# Patient Record
Sex: FEMALE | Race: WHITE | URBAN - METROPOLITAN AREA
[De-identification: names, ages, dates, MRNs, and addresses within clinical notes are randomized per-mention and may not be internally consistent; named-entity substitution may affect disease eponyms.]

---

## 2017-08-09 PROBLEM — I10 ESSENTIAL HYPERTENSION: Status: ACTIVE | Noted: 2017-08-09

## 2017-09-20 PROBLEM — C44.91 BASAL CELL CARCINOMA OF SKIN: Status: ACTIVE | Noted: 2017-09-20

## 2019-04-25 ENCOUNTER — OFFICE VISIT (OUTPATIENT)
Dept: PRIMARY CARE CLINIC | Age: 64
End: 2019-04-25
Payer: COMMERCIAL

## 2019-04-25 VITALS
OXYGEN SATURATION: 98 % | BODY MASS INDEX: 27.67 KG/M2 | DIASTOLIC BLOOD PRESSURE: 86 MMHG | WEIGHT: 137 LBS | HEART RATE: 81 BPM | SYSTOLIC BLOOD PRESSURE: 132 MMHG

## 2019-04-25 DIAGNOSIS — F41.9 ANXIETY: ICD-10-CM

## 2019-04-25 DIAGNOSIS — E78.5 HYPERLIPIDEMIA, UNSPECIFIED HYPERLIPIDEMIA TYPE: ICD-10-CM

## 2019-04-25 DIAGNOSIS — I10 ESSENTIAL HYPERTENSION: Primary | ICD-10-CM

## 2019-04-25 PROCEDURE — 99213 OFFICE O/P EST LOW 20 MIN: CPT | Performed by: FAMILY MEDICINE

## 2019-04-25 RX ORDER — ALPRAZOLAM 0.25 MG/1
0.25 TABLET ORAL 2 TIMES DAILY PRN
Qty: 30 TABLET | Refills: 0 | Status: SHIPPED | OUTPATIENT
Start: 2019-04-25 | End: 2020-09-14 | Stop reason: SDUPTHER

## 2019-04-25 ASSESSMENT — ENCOUNTER SYMPTOMS
SHORTNESS OF BREATH: 0
COUGH: 0
SORE THROAT: 0
NAUSEA: 0
RHINORRHEA: 0
EYE REDNESS: 0
WHEEZING: 0
VOMITING: 0
ABDOMINAL PAIN: 0
DIARRHEA: 0
EYE DISCHARGE: 0

## 2019-04-25 ASSESSMENT — PATIENT HEALTH QUESTIONNAIRE - PHQ9
2. FEELING DOWN, DEPRESSED OR HOPELESS: 0
1. LITTLE INTEREST OR PLEASURE IN DOING THINGS: 0
SUM OF ALL RESPONSES TO PHQ QUESTIONS 1-9: 0
SUM OF ALL RESPONSES TO PHQ QUESTIONS 1-9: 0
SUM OF ALL RESPONSES TO PHQ9 QUESTIONS 1 & 2: 0

## 2019-04-25 NOTE — PROGRESS NOTES
Maintenance   Topic Date Due    Cervical cancer screen  10/19/1978    Potassium monitoring  09/04/2019    Creatinine monitoring  09/04/2019    Breast cancer screen  08/02/2020    DTaP/Tdap/Td vaccine (2 - Td) 09/20/2020    Lipid screen  09/04/2023    Colon cancer screen colonoscopy  05/18/2026    Flu vaccine  Completed    Shingles Vaccine  Completed    Pneumococcal 0-64 years Vaccine  Completed    Hepatitis C screen  Completed    HIV screen  Completed       Subjective:      Review of Systems   Constitutional: Negative for chills, diaphoresis and fever. HENT: Negative for rhinorrhea and sore throat. Eyes: Negative for discharge and redness. Respiratory: Negative for cough, shortness of breath and wheezing. Cardiovascular: Negative for chest pain and palpitations. Gastrointestinal: Negative for abdominal pain, diarrhea, nausea and vomiting. Genitourinary: Negative for dysuria and frequency. Musculoskeletal: Negative for arthralgias and myalgias. Neurological: Positive for light-headedness (if gets up too fast). Negative for dizziness and headaches. Psychiatric/Behavioral: Negative for sleep disturbance. Objective:     /86   Pulse 81   Wt 137 lb (62.1 kg)   SpO2 98%   BMI 27.67 kg/m²   Physical Exam   Constitutional: She is oriented to person, place, and time. She appears well-developed and well-nourished. No distress. HENT:   Head: Normocephalic and atraumatic. Mouth/Throat: Oropharynx is clear and moist.   Eyes: Pupils are equal, round, and reactive to light. Conjunctivae are normal. Right eye exhibits no discharge. Left eye exhibits no discharge. No scleral icterus. Neck: No tracheal deviation present. No thyromegaly present. Cardiovascular: Normal rate, regular rhythm and normal heart sounds. No carotid bruits   Pulmonary/Chest: Effort normal and breath sounds normal. No respiratory distress. She has no wheezes. Musculoskeletal: She exhibits no edema.

## 2019-07-02 RX ORDER — ATORVASTATIN CALCIUM 10 MG/1
TABLET, FILM COATED ORAL
Qty: 90 TABLET | Refills: 3 | Status: SHIPPED | OUTPATIENT
Start: 2019-07-02 | End: 2019-09-12 | Stop reason: SDUPTHER

## 2019-09-05 LAB
ALBUMIN SERPL-MCNC: NORMAL G/DL
ALP BLD-CCNC: NORMAL U/L
ALT SERPL-CCNC: NORMAL U/L
ANION GAP SERPL CALCULATED.3IONS-SCNC: NORMAL MMOL/L
AST SERPL-CCNC: NORMAL U/L
BASOPHILS ABSOLUTE: NORMAL /ΜL
BASOPHILS RELATIVE PERCENT: NORMAL %
BILIRUB SERPL-MCNC: NORMAL MG/DL (ref 0.1–1.4)
BUN BLDV-MCNC: NORMAL MG/DL
CALCIUM SERPL-MCNC: NORMAL MG/DL
CHLORIDE BLD-SCNC: NORMAL MMOL/L
CHOLESTEROL, TOTAL: 205 MG/DL
CHOLESTEROL/HDL RATIO: 3
CO2: NORMAL MMOL/L
CREAT SERPL-MCNC: NORMAL MG/DL
EOSINOPHILS ABSOLUTE: NORMAL /ΜL
EOSINOPHILS RELATIVE PERCENT: NORMAL %
GFR CALCULATED: NORMAL
GLUCOSE BLD-MCNC: 94 MG/DL
HCT VFR BLD CALC: NORMAL % (ref 36–46)
HDLC SERPL-MCNC: 69 MG/DL (ref 35–70)
HEMOGLOBIN: NORMAL G/DL (ref 12–16)
LDL CHOLESTEROL CALCULATED: 107 MG/DL (ref 0–160)
LYMPHOCYTES ABSOLUTE: NORMAL /ΜL
LYMPHOCYTES RELATIVE PERCENT: NORMAL %
MCH RBC QN AUTO: NORMAL PG
MCHC RBC AUTO-ENTMCNC: NORMAL G/DL
MCV RBC AUTO: NORMAL FL
MONOCYTES ABSOLUTE: NORMAL /ΜL
MONOCYTES RELATIVE PERCENT: NORMAL %
NEUTROPHILS ABSOLUTE: NORMAL /ΜL
NEUTROPHILS RELATIVE PERCENT: NORMAL %
PDW BLD-RTO: NORMAL %
PLATELET # BLD: NORMAL K/ΜL
PMV BLD AUTO: NORMAL FL
POTASSIUM SERPL-SCNC: NORMAL MMOL/L
RBC # BLD: NORMAL 10^6/ΜL
SODIUM BLD-SCNC: NORMAL MMOL/L
TOTAL PROTEIN: NORMAL
TRIGL SERPL-MCNC: 146 MG/DL
VLDLC SERPL CALC-MCNC: 29 MG/DL
WBC # BLD: NORMAL 10^3/ML

## 2019-09-06 DIAGNOSIS — E78.5 HYPERLIPIDEMIA, UNSPECIFIED HYPERLIPIDEMIA TYPE: ICD-10-CM

## 2019-09-06 DIAGNOSIS — I10 ESSENTIAL HYPERTENSION: ICD-10-CM

## 2019-09-12 ENCOUNTER — OFFICE VISIT (OUTPATIENT)
Dept: PRIMARY CARE CLINIC | Age: 64
End: 2019-09-12
Payer: COMMERCIAL

## 2019-09-12 VITALS
HEART RATE: 75 BPM | WEIGHT: 139.2 LBS | OXYGEN SATURATION: 97 % | BODY MASS INDEX: 27.33 KG/M2 | SYSTOLIC BLOOD PRESSURE: 140 MMHG | HEIGHT: 60 IN | DIASTOLIC BLOOD PRESSURE: 88 MMHG

## 2019-09-12 DIAGNOSIS — I10 ESSENTIAL HYPERTENSION: Primary | ICD-10-CM

## 2019-09-12 PROCEDURE — 90686 IIV4 VACC NO PRSV 0.5 ML IM: CPT | Performed by: FAMILY MEDICINE

## 2019-09-12 PROCEDURE — 99213 OFFICE O/P EST LOW 20 MIN: CPT | Performed by: FAMILY MEDICINE

## 2019-09-12 PROCEDURE — 90471 IMMUNIZATION ADMIN: CPT | Performed by: FAMILY MEDICINE

## 2019-09-12 RX ORDER — ATORVASTATIN CALCIUM 10 MG/1
10 TABLET, FILM COATED ORAL DAILY
Qty: 90 TABLET | Refills: 3 | Status: SHIPPED | OUTPATIENT
Start: 2019-09-12 | End: 2020-08-31

## 2019-09-12 ASSESSMENT — ENCOUNTER SYMPTOMS
ABDOMINAL PAIN: 0
DIARRHEA: 0
SHORTNESS OF BREATH: 0
VOMITING: 0
SORE THROAT: 0
NAUSEA: 0
EYE DISCHARGE: 0
EYE REDNESS: 0
WHEEZING: 0
COUGH: 0
RHINORRHEA: 0

## 2019-09-12 NOTE — PROGRESS NOTES
[facility] PRIMARY [facility]  [address] [facility] [address]  Dept: [address] Manish is a 66 y.o. female who presents today for her medical conditions/complaintsas noted below. Chief Complaint   Patient presents with    Medication Check     5 month follow up.  Flu Vaccine     Pt wants her flu shot. HPI:     HPI-has been feeling okay. No new issues. LDL Calculated (mg/dL)   Date Value   2019 107   2018 113   2017 103       (goal LDL is <100)   No results found for: AST, ALT, BUN  BP Readings from Last 3 Encounters:   19 (!) 148/90   19 132/86   09/10/18 116/80          (goal 120/80)    No past medical history on file. No past surgical history on file. Family History   Problem Relation Age of Onset    Colon Cancer Father        Social History     Tobacco Use    Smoking status: Former Smoker     Packs/day: 0.50     Years: 15.00     Pack years: 7.50     Types: Cigarettes     Last attempt to quit: 1990     Years since quittin.7    Smokeless tobacco: Never Used   Substance Use Topics    Alcohol use: Not on file      Current Outpatient Medications   Medication Sig Dispense Refill    Magnesium 400 MG CAPS Take by mouth      atorvastatin (LIPITOR) 10 MG tablet Take 1 tablet by mouth daily 90 tablet 3    Misc Natural Products (GLUCOS-CHONDROIT-MSM COMPLEX) TABS Take 1 tablet by mouth 2 times daily 60 tablet 3    Omega-3 Fatty Acids (FISH OIL) 1200 MG CAPS Take 1,200 mg by mouth 2 times daily      Coenzyme Q10 (COQ10) 200 MG CAPS Take 200 mg by mouth daily      vitamin D (CHOLECALCIFEROL) 1000 UNITS TABS tablet Take 1,000 Units by mouth daily      Multiple Vitamins-Minerals (THERAPEUTIC MULTIVITAMIN-MINERALS) tablet Take 1 tablet by mouth daily      Carnitine-B5-B6 (L-CARNITINE 500 PO) Take 500 mg by mouth daily       No current facility-administered medications for this visit.       No Known Allergies    Health Maintenance   Topic Date Due    Flu vaccine (1) 09/01/2019    Cervical cancer screen  08/01/2020    Potassium monitoring  09/05/2020    Creatinine monitoring  09/05/2020    DTaP/Tdap/Td vaccine (2 - Td) 09/20/2020    Breast cancer screen  08/14/2021    Lipid screen  09/05/2024    Colon cancer screen colonoscopy  05/18/2026    Shingles Vaccine  Completed    Pneumococcal 0-64 years Vaccine  Completed    Hepatitis C screen  Completed    HIV screen  Completed       Subjective:      Review of Systems   Constitutional: Negative for chills and fever. HENT: Negative for congestion, rhinorrhea and sore throat. Eyes: Negative for discharge and redness. Respiratory: Negative for cough, shortness of breath and wheezing. Cardiovascular: Negative for chest pain and palpitations. Gastrointestinal: Negative for abdominal pain, diarrhea, nausea and vomiting. Genitourinary: Negative for dysuria and frequency. Musculoskeletal: Negative for arthralgias and myalgias. Neurological: Negative for dizziness, light-headedness and headaches. Psychiatric/Behavioral: Negative for sleep disturbance. Objective:     BP (!) 148/90 (Site: Right Upper Arm)   Pulse 75   Ht 5' (1.524 m)   Wt 139 lb 3.2 oz (63.1 kg)   SpO2 97%   BMI 27.19 kg/m²   Physical Exam   Constitutional: She is oriented to person, place, and time. She appears well-developed and well-nourished. No distress. HENT:   Head: Normocephalic and atraumatic. Mouth/Throat: Oropharynx is clear and moist.   Eyes: Pupils are equal, round, and reactive to light. Conjunctivae are normal. Right eye exhibits no discharge. Left eye exhibits no discharge. No scleral icterus. Neck: No tracheal deviation present. No thyromegaly present. Cardiovascular: Normal rate, regular rhythm and normal heart sounds. No carotid bruits   Pulmonary/Chest: Effort normal and breath sounds normal. No respiratory distress. She has no wheezes.

## 2020-08-31 RX ORDER — ATORVASTATIN CALCIUM 10 MG/1
10 TABLET, FILM COATED ORAL DAILY
Qty: 90 TABLET | Refills: 3 | Status: SHIPPED | OUTPATIENT
Start: 2020-08-31 | End: 2020-09-14 | Stop reason: SDUPTHER

## 2020-09-14 ENCOUNTER — OFFICE VISIT (OUTPATIENT)
Dept: PRIMARY CARE CLINIC | Age: 65
End: 2020-09-14
Payer: COMMERCIAL

## 2020-09-14 VITALS
SYSTOLIC BLOOD PRESSURE: 120 MMHG | TEMPERATURE: 96.8 F | DIASTOLIC BLOOD PRESSURE: 80 MMHG | WEIGHT: 129 LBS | OXYGEN SATURATION: 96 % | HEART RATE: 67 BPM | BODY MASS INDEX: 25.19 KG/M2

## 2020-09-14 PROCEDURE — 90471 IMMUNIZATION ADMIN: CPT | Performed by: FAMILY MEDICINE

## 2020-09-14 PROCEDURE — 90686 IIV4 VACC NO PRSV 0.5 ML IM: CPT | Performed by: FAMILY MEDICINE

## 2020-09-14 PROCEDURE — 90472 IMMUNIZATION ADMIN EACH ADD: CPT | Performed by: FAMILY MEDICINE

## 2020-09-14 PROCEDURE — 90715 TDAP VACCINE 7 YRS/> IM: CPT | Performed by: FAMILY MEDICINE

## 2020-09-14 PROCEDURE — 99213 OFFICE O/P EST LOW 20 MIN: CPT | Performed by: FAMILY MEDICINE

## 2020-09-14 RX ORDER — ATORVASTATIN CALCIUM 10 MG/1
10 TABLET, FILM COATED ORAL DAILY
Qty: 90 TABLET | Refills: 3 | Status: SHIPPED | OUTPATIENT
Start: 2020-09-14 | End: 2021-08-25

## 2020-09-14 RX ORDER — ALPRAZOLAM 0.25 MG/1
0.25 TABLET ORAL 2 TIMES DAILY PRN
Qty: 180 TABLET | Refills: 0 | Status: SHIPPED | OUTPATIENT
Start: 2020-09-14 | End: 2021-10-21 | Stop reason: SDUPTHER

## 2020-09-14 ASSESSMENT — PATIENT HEALTH QUESTIONNAIRE - PHQ9
2. FEELING DOWN, DEPRESSED OR HOPELESS: 0
SUM OF ALL RESPONSES TO PHQ9 QUESTIONS 1 & 2: 0
SUM OF ALL RESPONSES TO PHQ QUESTIONS 1-9: 0
1. LITTLE INTEREST OR PLEASURE IN DOING THINGS: 0
SUM OF ALL RESPONSES TO PHQ QUESTIONS 1-9: 0

## 2020-09-14 ASSESSMENT — ENCOUNTER SYMPTOMS
SHORTNESS OF BREATH: 0
SORE THROAT: 0
COUGH: 0
WHEEZING: 0
RHINORRHEA: 0
EYE DISCHARGE: 0
VOMITING: 0
DIARRHEA: 0
NAUSEA: 0
ABDOMINAL PAIN: 0
EYE REDNESS: 0

## 2020-09-14 NOTE — PROGRESS NOTES
[facility] PRIMARY [facility]ARE  [address] [place] [id number]  Dept: [address] [facility] Manish is a 60 y.o. female who presents today for her medical conditions/complaintsas noted below. Chief Complaint   Patient presents with    Hypertension     Patient checks bp at home     Medication Check    Medication Refill     Pending        HPI:     HPI- pt feeling okay. No problems. Needs flu shot and refills. Bp running good at home. LDL Calculated (mg/dL)   Date Value   2019 107   2018 113   2017 103       (goal LDL is <100)   No results found for: AST, ALT, BUN  BP Readings from Last 3 Encounters:   20 120/80   19 (!) 140/88   19 132/86          (goal 120/80)    No past medical history on file. No past surgical history on file. Family History   Problem Relation Age of Onset    Colon Cancer Father        Social History     Tobacco Use    Smoking status: Former Smoker     Packs/day: 0.50     Years: 15.00     Pack years: 7.50     Types: Cigarettes     Last attempt to quit:      Years since quittin.7    Smokeless tobacco: Never Used   Substance Use Topics    Alcohol use: Not on file      Current Outpatient Medications   Medication Sig Dispense Refill    atorvastatin (LIPITOR) 10 MG tablet Take 1 tablet by mouth daily 90 tablet 3    ALPRAZolam (XANAX) 0.25 MG tablet Take 1 tablet by mouth 2 times daily as needed for Sleep for up to 30 days.  180 tablet 0    Magnesium 400 MG CAPS Take by mouth      Misc Natural Products (GLUCOS-CHONDROIT-MSM COMPLEX) TABS Take 1 tablet by mouth 2 times daily 60 tablet 3    Omega-3 Fatty Acids (FISH OIL) 1200 MG CAPS Take 1,200 mg by mouth 2 times daily      Coenzyme Q10 (COQ10) 200 MG CAPS Take 200 mg by mouth daily      vitamin D (CHOLECALCIFEROL) 1000 UNITS TABS tablet Take 1,000 Units by mouth daily      Multiple Vitamins-Minerals (THERAPEUTIC MULTIVITAMIN-MINERALS) tablet Take 1 tablet by mouth daily      Carnitine-B5-B6 (L-CARNITINE 500 PO) Take 500 mg by mouth daily       No current facility-administered medications for this visit. No Known Allergies    Health Maintenance   Topic Date Due    Flu vaccine (1) 09/01/2020    Potassium monitoring  09/05/2020    Creatinine monitoring  09/05/2020    Lipid screen  09/05/2020    DTaP/Tdap/Td vaccine (2 - Td) 09/20/2020    Breast cancer screen  08/21/2022    Cervical cancer screen  08/25/2023    Colon cancer screen colonoscopy  05/18/2026    Shingles Vaccine  Completed    Pneumococcal 0-64 years Vaccine  Completed    Hepatitis C screen  Completed    HIV screen  Completed    Hepatitis A vaccine  Aged Out    Hepatitis B vaccine  Aged Out    Hib vaccine  Aged Out    Meningococcal (ACWY) vaccine  Aged Out       Subjective:      Review of Systems   Constitutional: Negative for chills and fever. HENT: Negative for rhinorrhea and sore throat. Eyes: Negative for discharge and redness. Respiratory: Negative for cough, shortness of breath and wheezing. Cardiovascular: Negative for chest pain and palpitations. Gastrointestinal: Negative for abdominal pain, diarrhea, nausea and vomiting. Genitourinary: Negative for dysuria and frequency. Musculoskeletal: Negative for arthralgias and myalgias. Neurological: Negative for dizziness, light-headedness and headaches. Psychiatric/Behavioral: Negative for sleep disturbance. Objective:     /80   Pulse 67   Temp 96.8 °F (36 °C)   Wt 129 lb (58.5 kg)   SpO2 96%   BMI 25.19 kg/m²   Physical Exam  Vitals signs and nursing note reviewed. Constitutional:       General: She is not in acute distress. Appearance: She is well-developed. She is not ill-appearing. HENT:      Head: Normocephalic and atraumatic. Right Ear: External ear normal.      Left Ear: External ear normal.   Eyes:      General: No scleral icterus. Right eye: No discharge. Left eye: No discharge. Conjunctiva/sclera: Conjunctivae normal.      Pupils: Pupils are equal, round, and reactive to light. Neck:      Thyroid: No thyromegaly. Trachea: No tracheal deviation. Cardiovascular:      Rate and Rhythm: Normal rate and regular rhythm. Heart sounds: Normal heart sounds. Pulmonary:      Effort: Pulmonary effort is normal. No respiratory distress. Breath sounds: Normal breath sounds. No wheezing. Lymphadenopathy:      Cervical: No cervical adenopathy. Skin:     General: Skin is warm. Findings: No rash. Neurological:      Mental Status: She is alert and oriented to person, place, and time. Psychiatric:         Mood and Affect: Mood normal.         Behavior: Behavior normal.         Thought Content: Thought content normal.         Assessment:       Diagnosis Orders   1. Essential hypertension     2. Anxiety  ALPRAZolam (XANAX) 0.25 MG tablet   3. Need for influenza vaccination  INFLUENZA, QUADV, 3 YRS AND OLDER, IM PF, PREFILL SYR OR SDV, 0.5ML (AFLURIA QUADV, PF)        Plan:      Return in about 3 months (around 12/14/2020) for HTN f/u. Orders Placed This Encounter   Procedures    INFLUENZA, QUADV, 3 YRS AND OLDER, IM PF, PREFILL SYR OR SDV, 0.5ML (AFLURIA QUADV, PF)     Orders Placed This Encounter   Medications    atorvastatin (LIPITOR) 10 MG tablet     Sig: Take 1 tablet by mouth daily     Dispense:  90 tablet     Refill:  3    ALPRAZolam (XANAX) 0.25 MG tablet     Sig: Take 1 tablet by mouth 2 times daily as needed for Sleep for up to 30 days. Dispense:  180 tablet     Refill:  0       Patient given educationalmaterials - see patient instructions. Discussed use, benefit, and side effectsof prescribed medications. All patient questions answered. Pt voiced understanding. Reviewed health maintenance. Instructed to continue current medications, diet andexercise. Patient agreed with treatment plan.  Follow

## 2020-09-15 ENCOUNTER — HOSPITAL ENCOUNTER (OUTPATIENT)
Age: 65
Setting detail: SPECIMEN
Discharge: HOME | End: 2020-09-15
Payer: COMMERCIAL

## 2020-09-15 LAB
ABSOLUTE EOS #: 0.16 K/UL (ref 0–0.44)
ABSOLUTE IMMATURE GRANULOCYTE: <0.03 K/UL (ref 0–0.3)
ABSOLUTE LYMPH #: 1.47 K/UL (ref 1.1–3.7)
ABSOLUTE MONO #: 0.45 K/UL (ref 0.1–1.2)
ANION GAP SERPL CALCULATED.3IONS-SCNC: 16 MMOL/L (ref 9–17)
BASOPHILS # BLD: 1 % (ref 0–2)
BASOPHILS ABSOLUTE: 0.04 K/UL (ref 0–0.2)
BUN BLDV-MCNC: 14 MG/DL (ref 8–23)
BUN/CREAT BLD: NORMAL (ref 9–20)
CALCIUM SERPL-MCNC: 9.8 MG/DL (ref 8.6–10.4)
CHLORIDE BLD-SCNC: 99 MMOL/L (ref 98–107)
CHOLESTEROL/HDL RATIO: 3.5
CHOLESTEROL: 197 MG/DL
CO2: 24 MMOL/L (ref 20–31)
CREAT SERPL-MCNC: 0.66 MG/DL (ref 0.5–0.9)
DIFFERENTIAL TYPE: NORMAL
EOSINOPHILS RELATIVE PERCENT: 3 % (ref 1–4)
GFR AFRICAN AMERICAN: >60 ML/MIN
GFR NON-AFRICAN AMERICAN: >60 ML/MIN
GFR SERPL CREATININE-BSD FRML MDRD: NORMAL ML/MIN/{1.73_M2}
GFR SERPL CREATININE-BSD FRML MDRD: NORMAL ML/MIN/{1.73_M2}
GLUCOSE BLD-MCNC: 75 MG/DL (ref 70–99)
HCT VFR BLD CALC: 43.2 % (ref 36.3–47.1)
HDLC SERPL-MCNC: 57 MG/DL
HEMOGLOBIN: 13.8 G/DL (ref 11.9–15.1)
IMMATURE GRANULOCYTES: 0 %
LDL CHOLESTEROL: 123 MG/DL (ref 0–130)
LYMPHOCYTES # BLD: 26 % (ref 24–43)
MCH RBC QN AUTO: 28.6 PG (ref 25.2–33.5)
MCHC RBC AUTO-ENTMCNC: 31.9 G/DL (ref 28.4–34.8)
MCV RBC AUTO: 89.4 FL (ref 82.6–102.9)
MONOCYTES # BLD: 8 % (ref 3–12)
NRBC AUTOMATED: 0 PER 100 WBC
PDW BLD-RTO: 13.2 % (ref 11.8–14.4)
PLATELET # BLD: 389 K/UL (ref 138–453)
PLATELET ESTIMATE: NORMAL
PMV BLD AUTO: 9.6 FL (ref 8.1–13.5)
POTASSIUM SERPL-SCNC: 4.1 MMOL/L (ref 3.7–5.3)
RBC # BLD: 4.83 M/UL (ref 3.95–5.11)
RBC # BLD: NORMAL 10*6/UL
SEG NEUTROPHILS: 62 % (ref 36–65)
SEGMENTED NEUTROPHILS ABSOLUTE COUNT: 3.53 K/UL (ref 1.5–8.1)
SODIUM BLD-SCNC: 139 MMOL/L (ref 135–144)
TRIGL SERPL-MCNC: 83 MG/DL
VLDLC SERPL CALC-MCNC: NORMAL MG/DL (ref 1–30)
WBC # BLD: 5.7 K/UL (ref 3.5–11.3)
WBC # BLD: NORMAL 10*3/UL

## 2021-08-04 ENCOUNTER — PATIENT MESSAGE (OUTPATIENT)
Dept: PRIMARY CARE CLINIC | Age: 66
End: 2021-08-04

## 2021-08-04 DIAGNOSIS — Z12.11 SCREENING FOR COLON CANCER: Primary | ICD-10-CM

## 2021-08-04 DIAGNOSIS — Z13.6 SCREENING FOR CARDIOVASCULAR CONDITION: ICD-10-CM

## 2021-08-04 DIAGNOSIS — Z13.1 SCREENING FOR DIABETES MELLITUS: ICD-10-CM

## 2021-08-25 RX ORDER — ATORVASTATIN CALCIUM 10 MG/1
10 TABLET, FILM COATED ORAL DAILY
Qty: 90 TABLET | Refills: 3 | Status: SHIPPED | OUTPATIENT
Start: 2021-08-25 | End: 2021-10-21 | Stop reason: SDUPTHER

## 2021-09-29 ENCOUNTER — HOSPITAL ENCOUNTER (OUTPATIENT)
Age: 66
Setting detail: SPECIMEN
Discharge: HOME | End: 2021-09-29
Payer: COMMERCIAL

## 2021-09-29 DIAGNOSIS — Z13.6 SCREENING FOR CARDIOVASCULAR CONDITION: ICD-10-CM

## 2021-09-29 DIAGNOSIS — Z13.1 SCREENING FOR DIABETES MELLITUS: ICD-10-CM

## 2021-09-29 DIAGNOSIS — Z12.11 SCREENING FOR COLON CANCER: ICD-10-CM

## 2021-09-29 LAB
ABSOLUTE EOS #: 0.12 K/UL (ref 0–0.44)
ABSOLUTE IMMATURE GRANULOCYTE: <0.03 K/UL (ref 0–0.3)
ABSOLUTE LYMPH #: 2.35 K/UL (ref 1.1–3.7)
ABSOLUTE MONO #: 0.54 K/UL (ref 0.1–1.2)
ALBUMIN SERPL-MCNC: 4.7 G/DL (ref 3.5–5.2)
ALBUMIN/GLOBULIN RATIO: 1.6 (ref 1–2.5)
ALP BLD-CCNC: 98 U/L (ref 35–104)
ALT SERPL-CCNC: 13 U/L (ref 5–33)
ANION GAP SERPL CALCULATED.3IONS-SCNC: 11 MMOL/L (ref 9–17)
AST SERPL-CCNC: 21 U/L
BASOPHILS # BLD: 1 % (ref 0–2)
BASOPHILS ABSOLUTE: 0.04 K/UL (ref 0–0.2)
BILIRUB SERPL-MCNC: 0.35 MG/DL (ref 0.3–1.2)
BUN BLDV-MCNC: 19 MG/DL (ref 8–23)
BUN/CREAT BLD: NORMAL (ref 9–20)
CALCIUM SERPL-MCNC: 9.8 MG/DL (ref 8.6–10.4)
CHLORIDE BLD-SCNC: 102 MMOL/L (ref 98–107)
CHOLESTEROL/HDL RATIO: 2.8
CHOLESTEROL: 203 MG/DL
CO2: 25 MMOL/L (ref 20–31)
CREAT SERPL-MCNC: 0.6 MG/DL (ref 0.5–0.9)
DIFFERENTIAL TYPE: ABNORMAL
EOSINOPHILS RELATIVE PERCENT: 2 % (ref 1–4)
GFR AFRICAN AMERICAN: >60 ML/MIN
GFR NON-AFRICAN AMERICAN: >60 ML/MIN
GFR SERPL CREATININE-BSD FRML MDRD: NORMAL ML/MIN/{1.73_M2}
GFR SERPL CREATININE-BSD FRML MDRD: NORMAL ML/MIN/{1.73_M2}
GLUCOSE BLD-MCNC: 96 MG/DL (ref 70–99)
HCT VFR BLD CALC: 42.9 % (ref 36.3–47.1)
HDLC SERPL-MCNC: 73 MG/DL
HEMOGLOBIN: 14.1 G/DL (ref 11.9–15.1)
IMMATURE GRANULOCYTES: 0 %
LDL CHOLESTEROL: 108 MG/DL (ref 0–130)
LYMPHOCYTES # BLD: 32 % (ref 24–43)
MCH RBC QN AUTO: 29.7 PG (ref 25.2–33.5)
MCHC RBC AUTO-ENTMCNC: 32.9 G/DL (ref 28.4–34.8)
MCV RBC AUTO: 90.3 FL (ref 82.6–102.9)
MONOCYTES # BLD: 7 % (ref 3–12)
NRBC AUTOMATED: 0 PER 100 WBC
PDW BLD-RTO: 13.2 % (ref 11.8–14.4)
PLATELET # BLD: 461 K/UL (ref 138–453)
PLATELET ESTIMATE: ABNORMAL
PMV BLD AUTO: 9.7 FL (ref 8.1–13.5)
POTASSIUM SERPL-SCNC: 4.7 MMOL/L (ref 3.7–5.3)
RBC # BLD: 4.75 M/UL (ref 3.95–5.11)
RBC # BLD: ABNORMAL 10*6/UL
SEG NEUTROPHILS: 58 % (ref 36–65)
SEGMENTED NEUTROPHILS ABSOLUTE COUNT: 4.34 K/UL (ref 1.5–8.1)
SODIUM BLD-SCNC: 138 MMOL/L (ref 135–144)
TOTAL PROTEIN: 7.7 G/DL (ref 6.4–8.3)
TRIGL SERPL-MCNC: 110 MG/DL
VLDLC SERPL CALC-MCNC: ABNORMAL MG/DL (ref 1–30)
WBC # BLD: 7.4 K/UL (ref 3.5–11.3)
WBC # BLD: ABNORMAL 10*3/UL

## 2021-10-21 ENCOUNTER — OFFICE VISIT (OUTPATIENT)
Dept: PRIMARY CARE CLINIC | Age: 66
End: 2021-10-21
Payer: COMMERCIAL

## 2021-10-21 VITALS
BODY MASS INDEX: 24.54 KG/M2 | WEIGHT: 125 LBS | OXYGEN SATURATION: 97 % | HEART RATE: 78 BPM | DIASTOLIC BLOOD PRESSURE: 76 MMHG | HEIGHT: 60 IN | SYSTOLIC BLOOD PRESSURE: 124 MMHG

## 2021-10-21 DIAGNOSIS — Z00.00 HEALTHCARE MAINTENANCE: Primary | ICD-10-CM

## 2021-10-21 DIAGNOSIS — Z23 NEED FOR INFLUENZA VACCINATION: ICD-10-CM

## 2021-10-21 DIAGNOSIS — F41.9 ANXIETY: ICD-10-CM

## 2021-10-21 DIAGNOSIS — Z12.11 SCREENING FOR COLON CANCER: ICD-10-CM

## 2021-10-21 PROCEDURE — 90471 IMMUNIZATION ADMIN: CPT | Performed by: FAMILY MEDICINE

## 2021-10-21 PROCEDURE — 90674 CCIIV4 VAC NO PRSV 0.5 ML IM: CPT | Performed by: FAMILY MEDICINE

## 2021-10-21 PROCEDURE — 99396 PREV VISIT EST AGE 40-64: CPT | Performed by: FAMILY MEDICINE

## 2021-10-21 RX ORDER — ALPRAZOLAM 0.25 MG/1
0.25 TABLET ORAL 2 TIMES DAILY PRN
Qty: 180 TABLET | Refills: 0 | Status: SHIPPED | OUTPATIENT
Start: 2021-10-21 | End: 2022-10-28 | Stop reason: SDUPTHER

## 2021-10-21 RX ORDER — ATORVASTATIN CALCIUM 10 MG/1
10 TABLET, FILM COATED ORAL DAILY
Qty: 90 TABLET | Refills: 3 | Status: SHIPPED | OUTPATIENT
Start: 2021-10-21 | End: 2022-10-28 | Stop reason: SDUPTHER

## 2021-10-21 SDOH — ECONOMIC STABILITY: FOOD INSECURITY: WITHIN THE PAST 12 MONTHS, THE FOOD YOU BOUGHT JUST DIDN'T LAST AND YOU DIDN'T HAVE MONEY TO GET MORE.: NEVER TRUE

## 2021-10-21 SDOH — ECONOMIC STABILITY: FOOD INSECURITY: WITHIN THE PAST 12 MONTHS, YOU WORRIED THAT YOUR FOOD WOULD RUN OUT BEFORE YOU GOT MONEY TO BUY MORE.: NEVER TRUE

## 2021-10-21 ASSESSMENT — ENCOUNTER SYMPTOMS
WHEEZING: 0
SHORTNESS OF BREATH: 0
DIARRHEA: 0
RHINORRHEA: 0
NAUSEA: 0
ABDOMINAL PAIN: 0
COUGH: 0
VOMITING: 0
SORE THROAT: 0
EYE DISCHARGE: 0
EYE REDNESS: 0

## 2021-10-21 ASSESSMENT — PATIENT HEALTH QUESTIONNAIRE - PHQ9
1. LITTLE INTEREST OR PLEASURE IN DOING THINGS: 0
2. FEELING DOWN, DEPRESSED OR HOPELESS: 0
SUM OF ALL RESPONSES TO PHQ QUESTIONS 1-9: 0
SUM OF ALL RESPONSES TO PHQ9 QUESTIONS 1 & 2: 0
SUM OF ALL RESPONSES TO PHQ QUESTIONS 1-9: 0
SUM OF ALL RESPONSES TO PHQ QUESTIONS 1-9: 0

## 2021-10-21 ASSESSMENT — SOCIAL DETERMINANTS OF HEALTH (SDOH): HOW HARD IS IT FOR YOU TO PAY FOR THE VERY BASICS LIKE FOOD, HOUSING, MEDICAL CARE, AND HEATING?: NOT HARD AT ALL

## 2021-10-21 NOTE — PROGRESS NOTES
[facility]  [address] [address]  Dept: [phone]        Patient: Alireza Landin  : 1955    CC:  Chief Complaint   Patient presents with    Annual Exam    Hypertension     Patient checks B/P at times    Medication Refill     Pending        HPI: Pt is here for physical today. No complaints or problems. PMH:   Past Medical History:   Diagnosis Date    Hodgkin's disease ([address] 2016       PSH:   Past Surgical History:   Procedure Laterality Date    IR PORT PLACEMENT < 5 YEARS      put in for chemo then taken out   Vipul Guzman 14       Allergies:No Known Allergies    Social History:   Social History     Socioeconomic History    Marital status: Unknown     Spouse name: None    Number of children: None    Years of education: None    Highest education level: None   Occupational History    None   Tobacco Use    Smoking status: Former Smoker     Packs/day: 0.50     Years: 15.00     Pack years: 7.50     Types: Cigarettes     Quit date:      Years since quittin.8    Smokeless tobacco: Never Used   Substance and Sexual Activity    Alcohol use: Yes     Alcohol/week: 10.0 standard drinks     Types: 10 Glasses of wine per week    Drug use: Never    Sexual activity: None   Other Topics Concern    None   Social History Narrative    None     Social Determinants of Health     Financial Resource Strain: Low Risk     Difficulty of Paying Living Expenses: Not hard at all   Food Insecurity: No Food Insecurity    Worried About Running Out of Food in the Last Year: Never true    Mitesh of Food in the Last Year: Never true   Transportation Needs:     Lack of Transportation (Medical):      Lack of Transportation (Non-Medical):    Physical Activity:     Days of Exercise per Week:     Minutes of Exercise per Session:    Stress:     Feeling of Stress :    Social Connections:     Frequency of Communication with Friends and Family:     Frequency of Social Gatherings with Friends and Family:     Attends [employer] Services:     Active Member of Clubs or Organizations:     Attends Club or Organization Meetings:     Marital Status:    Intimate Partner Violence:     Fear of Current or Ex-Partner:     Emotionally Abused:     Physically Abused:     Sexually Abused:        Family History:   Family History   Problem Relation Age of Onset    Colon Cancer Father     Diabetes Maternal Grandmother        ROS: Review of Systems   Constitutional: Negative for chills and fever. HENT: Negative for rhinorrhea and sore throat. Eyes: Negative for discharge and redness. Respiratory: Negative for cough, shortness of breath and wheezing. Cardiovascular: Negative for chest pain and palpitations. Gastrointestinal: Negative for abdominal pain, diarrhea, nausea and vomiting. Genitourinary: Negative for dysuria and frequency. Musculoskeletal: Negative for arthralgias and myalgias. Neurological: Negative for dizziness, light-headedness and headaches. Psychiatric/Behavioral: Negative for sleep disturbance. Physical Exam: Physical Exam  Vitals and nursing note reviewed. Constitutional:       General: She is not in acute distress. Appearance: She is well-developed. She is not ill-appearing. HENT:      Head: Normocephalic and atraumatic. Right Ear: External ear normal.      Left Ear: External ear normal.   Eyes:      General: No scleral icterus. Right eye: No discharge. Left eye: No discharge. Conjunctiva/sclera: Conjunctivae normal.      Pupils: Pupils are equal, round, and reactive to light. Neck:      Thyroid: No thyromegaly. Trachea: No tracheal deviation. Cardiovascular:      Rate and Rhythm: Normal rate and regular rhythm. Heart sounds: Normal heart sounds.    Pulmonary:      Effort: Pulmonary effort is normal. No respiratory distress. Breath sounds: Normal breath sounds. No wheezing. Lymphadenopathy:      Cervical: No cervical adenopathy. Skin:     General: Skin is warm. Findings: No rash. Neurological:      Mental Status: She is alert and oriented to person, place, and time. Psychiatric:         Mood and Affect: Mood normal.         Behavior: Behavior normal.         Thought Content: Thought content normal.         Assessment:    Diagnosis Orders   1. Healthcare maintenance     2. Anxiety  ALPRAZolam (XANAX) 0.25 MG tablet   3. Need for influenza vaccination  INFLUENZA, MDCK QUADV, 2 YRS AND OLDER, IM, PF, PREFILL SYR OR SDV, 0.5ML (FLUCELVAX QUADV, PF)   4. Screening for colon cancer  External Referral To Gastroenterology       Plan:  Return in about 3 months (around 1/21/2022) for HTN f/u. Orders Placed This Encounter   Procedures    INFLUENZA, MDCK QUADV, 2 YRS AND OLDER, IM, PF, PREFILL SYR OR SDV, 0.5ML (FLUCELVAX QUADV, PF)    External Referral To Gastroenterology     Referral Priority:   Routine     Referral Type:   Consult for Advice and Opinion     Referral Reason:   Specialty Services Required     Referred to Provider:   Rena Bustillo MD     Requested Specialty:   Gastroenterology     Number of Visits Requested:   1     Orders Placed This Encounter   Medications    atorvastatin (LIPITOR) 10 MG tablet     Sig: Take 1 tablet by mouth daily     Dispense:  90 tablet     Refill:  3    ALPRAZolam (XANAX) 0.25 MG tablet     Sig: Take 1 tablet by mouth 2 times daily as needed for Sleep for up to 30 days.      Dispense:  180 tablet     Refill:  0

## 2022-08-25 ENCOUNTER — OFFICE VISIT (OUTPATIENT)
Dept: PRIMARY CARE CLINIC | Age: 67
End: 2022-08-25
Payer: COMMERCIAL

## 2022-08-25 VITALS
BODY MASS INDEX: 26.01 KG/M2 | DIASTOLIC BLOOD PRESSURE: 82 MMHG | SYSTOLIC BLOOD PRESSURE: 136 MMHG | OXYGEN SATURATION: 100 % | HEART RATE: 77 BPM | WEIGHT: 133.2 LBS

## 2022-08-25 DIAGNOSIS — M20.001: Primary | ICD-10-CM

## 2022-08-25 PROCEDURE — 99213 OFFICE O/P EST LOW 20 MIN: CPT | Performed by: FAMILY MEDICINE

## 2022-08-25 ASSESSMENT — PATIENT HEALTH QUESTIONNAIRE - PHQ9
SUM OF ALL RESPONSES TO PHQ QUESTIONS 1-9: 0
2. FEELING DOWN, DEPRESSED OR HOPELESS: 0
1. LITTLE INTEREST OR PLEASURE IN DOING THINGS: 0
SUM OF ALL RESPONSES TO PHQ QUESTIONS 1-9: 0
SUM OF ALL RESPONSES TO PHQ9 QUESTIONS 1 & 2: 0

## 2022-08-25 ASSESSMENT — ENCOUNTER SYMPTOMS
COUGH: 0
CHEST TIGHTNESS: 0
SHORTNESS OF BREATH: 0

## 2022-08-25 NOTE — PROGRESS NOTES
[address]  Phone: [phone]  Fax: [phone]    Nery Almeida is a 61 y.o. female who presents today for her medical conditions/complaints as noted below. HPI:     HPI  Pt struck right hand on concrete and umbrella staff on 4th of July weekend  Tried ice and heat and advil      Current Outpatient Medications   Medication Sig Dispense Refill    atorvastatin (LIPITOR) 10 MG tablet Take 1 tablet by mouth daily 90 tablet 3    Magnesium 400 MG CAPS Take by mouth      Omega-3 Fatty Acids (FISH OIL) 1200 MG CAPS Take 1,200 mg by mouth 2 times daily      Coenzyme Q10 (COQ10) 200 MG CAPS Take 200 mg by mouth daily      vitamin D (CHOLECALCIFEROL) 1000 UNITS TABS tablet Take 1,000 Units by mouth daily      Multiple Vitamins-Minerals (THERAPEUTIC MULTIVITAMIN-MINERALS) tablet Take 1 tablet by mouth daily      Carnitine-B5-B6 (L-CARNITINE 500 PO) Take 500 mg by mouth daily      Misc Natural Products (GLUCOS-CHONDROIT-MSM COMPLEX) TABS Take 1 tablet by mouth 2 times daily (Patient not taking: Reported on 8/25/2022) 60 tablet 3     No current facility-administered medications for this visit. No Known Allergies    Subjective:      Review of Systems   Constitutional:  Negative for chills and fever. Respiratory:  Negative for cough, chest tightness and shortness of breath. Cardiovascular:  Negative for chest pain and palpitations. Neurological:  Negative for numbness. Objective:     /82   Pulse 77   Wt 133 lb 3.2 oz (60.4 kg)   SpO2 100%   BMI 26.01 kg/m²   Physical Exam  Musculoskeletal:         General: Deformity present. No swelling, tenderness or signs of injury. Right hand: Deformity (lateral deviation of index finger, overlapping middle finger. worse with finger flexion than extension) present. No bony tenderness. Normal range of motion. Normal strength. Normal sensation.  There is no disruption of two-point discrimination. Left hand: Normal.   Skin:     General: Skin is warm and dry. Assessment/Plan:     1. Deformity, finger acquired, right  -     External Referral To Hand Surgery     Return if symptoms worsen or fail to improve. Orders Placed This Encounter   Procedures    External Referral To Hand Surgery     Referral Priority:   Routine     Referral Type:   Eval and Treat     Referral Reason:   Specialty Services Required     Requested Specialty:   Hand Surgery     Number of Visits Requested:   1       No orders of the defined types were placed in this encounter.           Electronically signed by Beba Murray, [address] [place] [address] 8/25/2022 at 11:45 AM

## 2022-10-03 ENCOUNTER — HOSPITAL ENCOUNTER (OUTPATIENT)
Age: 67
Setting detail: SPECIMEN
Discharge: HOME | End: 2022-10-03

## 2022-10-03 DIAGNOSIS — I10 ESSENTIAL HYPERTENSION: ICD-10-CM

## 2022-10-03 DIAGNOSIS — E78.5 HYPERLIPIDEMIA, UNSPECIFIED HYPERLIPIDEMIA TYPE: ICD-10-CM

## 2022-10-03 DIAGNOSIS — Z13.1 SCREENING FOR DIABETES MELLITUS: ICD-10-CM

## 2022-10-03 DIAGNOSIS — Z13.6 SCREENING FOR CARDIOVASCULAR CONDITION: ICD-10-CM

## 2022-10-03 LAB
ABSOLUTE EOS #: 0.09 K/UL (ref 0–0.44)
ABSOLUTE IMMATURE GRANULOCYTE: <0.03 K/UL (ref 0–0.3)
ABSOLUTE LYMPH #: 1.96 K/UL (ref 1.1–3.7)
ABSOLUTE MONO #: 0.48 K/UL (ref 0.1–1.2)
ALBUMIN SERPL-MCNC: 4.8 G/DL (ref 3.5–5.2)
ALBUMIN/GLOBULIN RATIO: 1.6 (ref 1–2.5)
ALP BLD-CCNC: 86 U/L (ref 35–104)
ALT SERPL-CCNC: 13 U/L (ref 5–33)
ANION GAP SERPL CALCULATED.3IONS-SCNC: 14 MMOL/L (ref 9–17)
AST SERPL-CCNC: 21 U/L
BASOPHILS # BLD: 0 % (ref 0–2)
BASOPHILS ABSOLUTE: <0.03 K/UL (ref 0–0.2)
BILIRUB SERPL-MCNC: 0.4 MG/DL (ref 0.3–1.2)
BUN BLDV-MCNC: 16 MG/DL (ref 8–23)
CALCIUM SERPL-MCNC: 9.7 MG/DL (ref 8.6–10.4)
CHLORIDE BLD-SCNC: 105 MMOL/L (ref 98–107)
CHOLESTEROL/HDL RATIO: 3.3
CHOLESTEROL: 212 MG/DL
CO2: 25 MMOL/L (ref 20–31)
CREAT SERPL-MCNC: 0.6 MG/DL (ref 0.5–0.9)
EOSINOPHILS RELATIVE PERCENT: 2 % (ref 1–4)
GFR AFRICAN AMERICAN: >60 ML/MIN
GFR NON-AFRICAN AMERICAN: >60 ML/MIN
GFR SERPL CREATININE-BSD FRML MDRD: NORMAL ML/MIN/{1.73_M2}
GLUCOSE BLD-MCNC: 93 MG/DL (ref 70–99)
HCT VFR BLD CALC: 42.7 % (ref 36.3–47.1)
HDLC SERPL-MCNC: 65 MG/DL
HEMOGLOBIN: 14.2 G/DL (ref 11.9–15.1)
IMMATURE GRANULOCYTES: 0 %
LDL CHOLESTEROL: 129 MG/DL (ref 0–130)
LYMPHOCYTES # BLD: 33 % (ref 24–43)
MCH RBC QN AUTO: 30.1 PG (ref 25.2–33.5)
MCHC RBC AUTO-ENTMCNC: 33.3 G/DL (ref 28.4–34.8)
MCV RBC AUTO: 90.7 FL (ref 82.6–102.9)
MONOCYTES # BLD: 8 % (ref 3–12)
NRBC AUTOMATED: 0 PER 100 WBC
PDW BLD-RTO: 12.8 % (ref 11.8–14.4)
PLATELET # BLD: 377 K/UL (ref 138–453)
PMV BLD AUTO: 9.5 FL (ref 8.1–13.5)
POTASSIUM SERPL-SCNC: 4.6 MMOL/L (ref 3.7–5.3)
RBC # BLD: 4.71 M/UL (ref 3.95–5.11)
SEG NEUTROPHILS: 57 % (ref 36–65)
SEGMENTED NEUTROPHILS ABSOLUTE COUNT: 3.38 K/UL (ref 1.5–8.1)
SODIUM BLD-SCNC: 144 MMOL/L (ref 135–144)
TOTAL PROTEIN: 7.8 G/DL (ref 6.4–8.3)
TRIGL SERPL-MCNC: 90 MG/DL
WBC # BLD: 5.9 K/UL (ref 3.5–11.3)

## 2022-10-21 SDOH — HEALTH STABILITY: PHYSICAL HEALTH: ON AVERAGE, HOW MANY MINUTES DO YOU ENGAGE IN EXERCISE AT THIS LEVEL?: 30 MIN

## 2022-10-21 SDOH — HEALTH STABILITY: PHYSICAL HEALTH: ON AVERAGE, HOW MANY DAYS PER WEEK DO YOU ENGAGE IN MODERATE TO STRENUOUS EXERCISE (LIKE A BRISK WALK)?: 5 DAYS

## 2022-10-21 ASSESSMENT — LIFESTYLE VARIABLES
HOW OFTEN DURING THE LAST YEAR HAVE YOU BEEN UNABLE TO REMEMBER WHAT HAPPENED THE NIGHT BEFORE BECAUSE YOU HAD BEEN DRINKING: NEVER
HOW MANY STANDARD DRINKS CONTAINING ALCOHOL DO YOU HAVE ON A TYPICAL DAY: 2
HOW OFTEN DO YOU HAVE A DRINK CONTAINING ALCOHOL: 5
HOW OFTEN DURING THE LAST YEAR HAVE YOU FAILED TO DO WHAT WAS NORMALLY EXPECTED FROM YOU BECAUSE OF DRINKING: NEVER
HOW OFTEN DURING THE LAST YEAR HAVE YOU HAD A FEELING OF GUILT OR REMORSE AFTER DRINKING: NEVER
HOW OFTEN DURING THE LAST YEAR HAVE YOU FOUND THAT YOU WERE NOT ABLE TO STOP DRINKING ONCE YOU HAD STARTED: NEVER
HAS A RELATIVE, FRIEND, DOCTOR, OR ANOTHER HEALTH PROFESSIONAL EXPRESSED CONCERN ABOUT YOUR DRINKING OR SUGGESTED YOU CUT DOWN: 0
HOW OFTEN DURING THE LAST YEAR HAVE YOU HAD A FEELING OF GUILT OR REMORSE AFTER DRINKING: 0
HOW OFTEN DO YOU HAVE SIX OR MORE DRINKS ON ONE OCCASION: 1
HAVE YOU OR SOMEONE ELSE BEEN INJURED AS A RESULT OF YOUR DRINKING: 0
HOW OFTEN DURING THE LAST YEAR HAVE YOU FOUND THAT YOU WERE NOT ABLE TO STOP DRINKING ONCE YOU HAD STARTED: 0
HOW OFTEN DURING THE LAST YEAR HAVE YOU FAILED TO DO WHAT WAS NORMALLY EXPECTED FROM YOU BECAUSE OF DRINKING: 0
HAS A RELATIVE, FRIEND, DOCTOR, OR ANOTHER HEALTH PROFESSIONAL EXPRESSED CONCERN ABOUT YOUR DRINKING OR SUGGESTED YOU CUT DOWN: NO
HOW OFTEN DURING THE LAST YEAR HAVE YOU BEEN UNABLE TO REMEMBER WHAT HAPPENED THE NIGHT BEFORE BECAUSE YOU HAD BEEN DRINKING: 0
HOW OFTEN DURING THE LAST YEAR HAVE YOU NEEDED AN ALCOHOLIC DRINK FIRST THING IN THE MORNING TO GET YOURSELF GOING AFTER A NIGHT OF HEAVY DRINKING: 0
HAVE YOU OR SOMEONE ELSE BEEN INJURED AS A RESULT OF YOUR DRINKING: NO
HOW OFTEN DO YOU HAVE A DRINK CONTAINING ALCOHOL: 4 OR MORE TIMES A WEEK
HOW OFTEN DURING THE LAST YEAR HAVE YOU NEEDED AN ALCOHOLIC DRINK FIRST THING IN THE MORNING TO GET YOURSELF GOING AFTER A NIGHT OF HEAVY DRINKING: NEVER
HOW MANY STANDARD DRINKS CONTAINING ALCOHOL DO YOU HAVE ON A TYPICAL DAY: 3 OR 4

## 2022-10-21 ASSESSMENT — PATIENT HEALTH QUESTIONNAIRE - PHQ9
1. LITTLE INTEREST OR PLEASURE IN DOING THINGS: 0
SUM OF ALL RESPONSES TO PHQ QUESTIONS 1-9: 0
SUM OF ALL RESPONSES TO PHQ9 QUESTIONS 1 & 2: 0
SUM OF ALL RESPONSES TO PHQ QUESTIONS 1-9: 0
2. FEELING DOWN, DEPRESSED OR HOPELESS: 0

## 2022-10-28 ENCOUNTER — OFFICE VISIT (OUTPATIENT)
Dept: PRIMARY CARE CLINIC | Age: 67
End: 2022-10-28
Payer: MEDICARE

## 2022-10-28 VITALS
BODY MASS INDEX: 26.15 KG/M2 | HEIGHT: 60 IN | OXYGEN SATURATION: 99 % | HEART RATE: 75 BPM | DIASTOLIC BLOOD PRESSURE: 74 MMHG | WEIGHT: 133.2 LBS | SYSTOLIC BLOOD PRESSURE: 126 MMHG

## 2022-10-28 DIAGNOSIS — Z00.00 MEDICARE ANNUAL WELLNESS VISIT, INITIAL: Primary | ICD-10-CM

## 2022-10-28 DIAGNOSIS — F41.9 ANXIETY: ICD-10-CM

## 2022-10-28 PROCEDURE — 3074F SYST BP LT 130 MM HG: CPT | Performed by: FAMILY MEDICINE

## 2022-10-28 PROCEDURE — G0009 ADMIN PNEUMOCOCCAL VACCINE: HCPCS | Performed by: FAMILY MEDICINE

## 2022-10-28 PROCEDURE — 1123F ACP DISCUSS/DSCN MKR DOCD: CPT | Performed by: FAMILY MEDICINE

## 2022-10-28 PROCEDURE — 90732 PPSV23 VACC 2 YRS+ SUBQ/IM: CPT | Performed by: FAMILY MEDICINE

## 2022-10-28 PROCEDURE — G0438 PPPS, INITIAL VISIT: HCPCS | Performed by: FAMILY MEDICINE

## 2022-10-28 PROCEDURE — 3078F DIAST BP <80 MM HG: CPT | Performed by: FAMILY MEDICINE

## 2022-10-28 RX ORDER — ATORVASTATIN CALCIUM 10 MG/1
10 TABLET, FILM COATED ORAL DAILY
Qty: 90 TABLET | Refills: 3 | Status: SHIPPED | OUTPATIENT
Start: 2022-10-28

## 2022-10-28 RX ORDER — ALPRAZOLAM 0.25 MG/1
0.25 TABLET ORAL 2 TIMES DAILY PRN
Qty: 180 TABLET | Refills: 0 | Status: SHIPPED | OUTPATIENT
Start: 2022-10-28 | End: 2022-11-27

## 2022-10-28 SDOH — ECONOMIC STABILITY: FOOD INSECURITY: WITHIN THE PAST 12 MONTHS, THE FOOD YOU BOUGHT JUST DIDN'T LAST AND YOU DIDN'T HAVE MONEY TO GET MORE.: NEVER TRUE

## 2022-10-28 SDOH — ECONOMIC STABILITY: FOOD INSECURITY: WITHIN THE PAST 12 MONTHS, YOU WORRIED THAT YOUR FOOD WOULD RUN OUT BEFORE YOU GOT MONEY TO BUY MORE.: NEVER TRUE

## 2022-10-28 ASSESSMENT — SOCIAL DETERMINANTS OF HEALTH (SDOH): HOW HARD IS IT FOR YOU TO PAY FOR THE VERY BASICS LIKE FOOD, HOUSING, MEDICAL CARE, AND HEATING?: NOT HARD AT ALL

## 2022-10-28 NOTE — PROGRESS NOTES
Medicare Annual Wellness Visit    Sophie Pope is here for Medicare AWV (Patient was given the words respect elephant and green to remember. She was given the time of 2:15 to draw ) and Hypertension (Patient checks B/P at home )    Assessment & Plan   Medicare annual wellness visit, initial    Recommendations for Preventive Services Due: see orders and patient instructions/AVS.  Recommended screening schedule for the next 5-10 years is provided to the patient in written form: see Patient Instructions/AVS.     Return in about 1 year (around 10/28/2023) for [address] Subjective   The following acute and/or chronic problems were also addressed today:  No issues to discuss. Patient's complete Health Risk Assessment and screening values have been reviewed and are found in Flowsheets. The following problems were reviewed today and where indicated follow up appointments were made and/or referrals ordered. Positive Risk Factor Screenings with Interventions:        Alcohol Screening:  Alcohol Use: Heavy Drinker    Frequency of Alcohol Consumption: 4 or more times a week    Average Number of Drinks: 3 or 4    Frequency of Binge Drinking: Never     AUDIT-C Score: 5  AUDIT Total Score: 5  An AUDIT-C score of 3-7 indicates potential alcohol risk. An AUDIT Total score of 8 or more is associated with harmful or hazardous drinking. A score of 13 or more in women, and 15 or more in men, is likely to indicate alcohol dependence.   Substance Use - Alcohol Interventions:  Pt drinks 2 per day  on avg.         General Health and ACP:  General  In general, how would you say your health is?: Excellent  In the past 7 days, have you experienced any of the following: New or Increased Pain, New or Increased Fatigue, Loneliness, Social Isolation, Stress or Anger?: No  Do you get the social and emotional support that you need?: Yes  Do you have a Living Will?: (!) No    Advance Directives       Power of [place] [employer] Will ACP-Advance Directive ACP-Power of     Not on File Not on File Not on File Not on File        General Health Risk Interventions:  Not sure if she has POA-       Safety:  Do you have working smoke detectors?: Yes  Do you have any tripping hazards - loose or unsecured carpets or rugs?: No  Do you have any tripping hazards - clutter in doorways, halls, or stairs?: No  Do you have either shower bars, grab bars, non-slip mats or non-slip surfaces in your shower or bathtub?: (!) No  Do all of your stairways have a railing or banister?: Yes  Do you always fasten your seatbelt when you are in a car?: Yes  Safety Interventions:  Home safety tips provided           Objective   Vitals:    10/28/22 1037   BP: 126/74   Pulse: 75   SpO2: 99%   Weight: 133 lb 3.2 oz (60.4 kg)   Height: 5' 0.24\" (1.53 m)      Body mass index is 25.81 kg/m². Physical Exam  Vitals and nursing note reviewed. Constitutional:       General: She is not in acute distress. Appearance: She is well-developed. She is not ill-appearing. HENT:      Head: Normocephalic and atraumatic. Right Ear: External ear normal.      Left Ear: External ear normal.   Eyes:      General: No scleral icterus. Right eye: No discharge. Left eye: No discharge. Conjunctiva/sclera: Conjunctivae normal.   Neck:      Thyroid: No thyromegaly. Trachea: No tracheal deviation. Cardiovascular:      Rate and Rhythm: Normal rate and regular rhythm. Heart sounds: Normal heart sounds. Pulmonary:      Effort: Pulmonary effort is normal. No respiratory distress. Breath sounds: Normal breath sounds. No wheezing. Lymphadenopathy:      Cervical: No cervical adenopathy. Skin:     General: Skin is warm. Findings: No rash. Neurological:      Mental Status: She is alert and oriented to person, place, and time. Psychiatric:         Mood and Affect: Mood normal.         Behavior: Behavior normal.         Thought Content:  Thought content normal.           No Known Allergies  Prior to Visit Medications    Medication Sig Taking?  Authorizing Provider   atorvastatin (LIPITOR) 10 MG tablet Take 1 tablet by mouth daily Yes Dalia Fung MD   Magnesium 400 MG CAPS Take by mouth Yes Historical Provider, MD   Omega-3 Fatty Acids (FISH OIL) 1200 MG CAPS Take 1,200 mg by mouth 2 times daily Yes Historical Provider, MD   Coenzyme Q10 (COQ10) 200 MG CAPS Take 200 mg by mouth daily Yes Historical Provider, MD   vitamin D (CHOLECALCIFEROL) 1000 UNITS TABS tablet Take 1,000 Units by mouth daily Yes Historical Provider, MD   Multiple Vitamins-Minerals (THERAPEUTIC MULTIVITAMIN-MINERALS) tablet Take 1 tablet by mouth daily Yes Historical Provider, MD   Carnitine-B5-B6 (L-CARNITINE 500 PO) Take 500 mg by mouth daily Yes Historical Provider, MD   Misc Natural Products (GLUCOS-CHONDROIT-MSM COMPLEX) TABS Take 1 tablet by mouth 2 times daily  Patient not taking: No sig reported  Dalia Fung MD       CareTe (Including outside providers/suppliers regularly involved in providing care):   Patient Care Team:  Dalia Fung MD as PCP - General (Family Medicine)  Dalia Fung MD as PCP - [facility] Empaneled Provider     Reviewed and updated this visit:  Tobacco  Allergies  Meds  Med Hx  Surg Hx  Soc Hx  Fam Hx

## 2023-01-23 ENCOUNTER — TELEPHONE (OUTPATIENT)
Dept: PRIMARY CARE CLINIC | Age: 68
End: 2023-01-23

## 2023-01-23 DIAGNOSIS — Z13.1 SCREENING FOR DIABETES MELLITUS: Primary | ICD-10-CM

## 2023-01-23 DIAGNOSIS — Z13.6 SCREENING FOR CARDIOVASCULAR CONDITION: ICD-10-CM

## 2023-01-23 DIAGNOSIS — I10 ESSENTIAL HYPERTENSION: ICD-10-CM

## 2023-01-23 NOTE — TELEPHONE ENCOUNTER
----- Message from Tammy Morales sent at 1/23/2023  4:15 PM EST -----  Subject: Referral Request    Reason for referral request? PT has her annual wellness visit scheduled   for 10.30.2023 and would like orders for blood work to be put in the   system so she is able to get this done prior to her appointment. Please   reach out to the PT once the orders are in the system. Provider patient wants to be referred to(if known):     Provider Phone Number(if known):     Additional Information for Provider?   ---------------------------------------------------------------------------  --------------  [address]    [phone]; OK to leave message on voicemail  ---------------------------------------------------------------------------  --------------

## 2023-08-25 DIAGNOSIS — Z12.39 ENCOUNTER FOR SCREENING FOR MALIGNANT NEOPLASM OF BREAST, UNSPECIFIED SCREENING MODALITY: Primary | ICD-10-CM

## 2023-08-29 DIAGNOSIS — Z12.31 VISIT FOR SCREENING MAMMOGRAM: Primary | ICD-10-CM

## 2023-09-07 ENCOUNTER — PATIENT MESSAGE (OUTPATIENT)
Dept: PRIMARY CARE CLINIC | Age: 68
End: 2023-09-07

## 2023-09-07 DIAGNOSIS — M25.519 ACUTE SHOULDER PAIN, UNSPECIFIED LATERALITY: Primary | ICD-10-CM

## 2023-09-08 RX ORDER — PREDNISONE 5 MG/1
TABLET ORAL
Qty: 36 TABLET | Refills: 0 | Status: SHIPPED | OUTPATIENT
Start: 2023-09-08

## 2023-09-21 ENCOUNTER — OFFICE VISIT (OUTPATIENT)
Dept: OBGYN CLINIC | Age: 68
End: 2023-09-21

## 2023-09-21 VITALS
HEIGHT: 60 IN | SYSTOLIC BLOOD PRESSURE: 126 MMHG | DIASTOLIC BLOOD PRESSURE: 84 MMHG | BODY MASS INDEX: 25.25 KG/M2 | WEIGHT: 128.6 LBS

## 2023-09-21 DIAGNOSIS — Z01.419 ENCOUNTER FOR GYNECOLOGICAL EXAMINATION: Primary | ICD-10-CM

## 2023-09-21 ASSESSMENT — PATIENT HEALTH QUESTIONNAIRE - PHQ9
2. FEELING DOWN, DEPRESSED OR HOPELESS: 0
SUM OF ALL RESPONSES TO PHQ QUESTIONS 1-9: 0
1. LITTLE INTEREST OR PLEASURE IN DOING THINGS: 0
SUM OF ALL RESPONSES TO PHQ QUESTIONS 1-9: 0
SUM OF ALL RESPONSES TO PHQ9 QUESTIONS 1 & 2: 0
SUM OF ALL RESPONSES TO PHQ QUESTIONS 1-9: 0
SUM OF ALL RESPONSES TO PHQ QUESTIONS 1-9: 0

## 2023-09-21 ASSESSMENT — ENCOUNTER SYMPTOMS
BACK PAIN: 0
ABDOMINAL DISTENTION: 0
COUGH: 0
SHORTNESS OF BREATH: 0
GASTROINTESTINAL NEGATIVE: 1
RESPIRATORY NEGATIVE: 1
ALLERGIC/IMMUNOLOGIC NEGATIVE: 1

## 2023-10-02 ENCOUNTER — OFFICE VISIT (OUTPATIENT)
Dept: PRIMARY CARE CLINIC | Age: 68
End: 2023-10-02
Payer: MEDICARE

## 2023-10-02 VITALS
DIASTOLIC BLOOD PRESSURE: 80 MMHG | WEIGHT: 129 LBS | HEIGHT: 60 IN | SYSTOLIC BLOOD PRESSURE: 126 MMHG | HEART RATE: 83 BPM | OXYGEN SATURATION: 96 % | BODY MASS INDEX: 25.32 KG/M2

## 2023-10-02 DIAGNOSIS — H65.02 NON-RECURRENT ACUTE SEROUS OTITIS MEDIA OF LEFT EAR: ICD-10-CM

## 2023-10-02 DIAGNOSIS — M25.511 ACUTE PAIN OF RIGHT SHOULDER: Primary | ICD-10-CM

## 2023-10-02 PROCEDURE — 3074F SYST BP LT 130 MM HG: CPT | Performed by: STUDENT IN AN ORGANIZED HEALTH CARE EDUCATION/TRAINING PROGRAM

## 2023-10-02 PROCEDURE — 1123F ACP DISCUSS/DSCN MKR DOCD: CPT | Performed by: STUDENT IN AN ORGANIZED HEALTH CARE EDUCATION/TRAINING PROGRAM

## 2023-10-02 PROCEDURE — 99214 OFFICE O/P EST MOD 30 MIN: CPT | Performed by: STUDENT IN AN ORGANIZED HEALTH CARE EDUCATION/TRAINING PROGRAM

## 2023-10-02 PROCEDURE — 3079F DIAST BP 80-89 MM HG: CPT | Performed by: STUDENT IN AN ORGANIZED HEALTH CARE EDUCATION/TRAINING PROGRAM

## 2023-10-02 RX ORDER — AMOXICILLIN AND CLAVULANATE POTASSIUM 875; 125 MG/1; MG/1
1 TABLET, FILM COATED ORAL 2 TIMES DAILY
Qty: 14 TABLET | Refills: 0 | Status: SHIPPED | OUTPATIENT
Start: 2023-10-02 | End: 2023-10-09

## 2023-10-02 SDOH — ECONOMIC STABILITY: TRANSPORTATION INSECURITY
IN THE PAST 12 MONTHS, HAS LACK OF TRANSPORTATION KEPT YOU FROM MEETINGS, WORK, OR FROM GETTING THINGS NEEDED FOR DAILY LIVING?: NO

## 2023-10-02 SDOH — ECONOMIC STABILITY: FOOD INSECURITY: WITHIN THE PAST 12 MONTHS, YOU WORRIED THAT YOUR FOOD WOULD RUN OUT BEFORE YOU GOT MONEY TO BUY MORE.: NEVER TRUE

## 2023-10-02 SDOH — ECONOMIC STABILITY: FOOD INSECURITY: WITHIN THE PAST 12 MONTHS, THE FOOD YOU BOUGHT JUST DIDN'T LAST AND YOU DIDN'T HAVE MONEY TO GET MORE.: NEVER TRUE

## 2023-10-02 SDOH — ECONOMIC STABILITY: INCOME INSECURITY: HOW HARD IS IT FOR YOU TO PAY FOR THE VERY BASICS LIKE FOOD, HOUSING, MEDICAL CARE, AND HEATING?: NOT HARD AT ALL

## 2023-10-02 SDOH — ECONOMIC STABILITY: HOUSING INSECURITY
IN THE LAST 12 MONTHS, WAS THERE A TIME WHEN YOU DID NOT HAVE A STEADY PLACE TO SLEEP OR SLEPT IN A SHELTER (INCLUDING NOW)?: NO

## 2023-10-02 ASSESSMENT — ENCOUNTER SYMPTOMS
SORE THROAT: 0
RHINORRHEA: 0
ABDOMINAL PAIN: 0
DIARRHEA: 0
VOMITING: 0
NAUSEA: 0
SHORTNESS OF BREATH: 0

## 2023-10-02 NOTE — PROGRESS NOTES
[id number] PRIMARY CARE  [address] [facility] [address]  Dept: [address] Rivas is a 74 y.o. female Established patient, who presents acutely for her complaints as noted below:    Chief Complaint   Patient presents with    Shoulder Pain     Right shoulder pain x 3 weeks. No fall or injury     Ear Fullness       HPI:     Right shoulder pain: Was seen 3 weeks ago, started prednisone taper for shoulder pain. Had gotten better. Now she is having some shoulder tenderness. Has been taking ibuprofen 600 mg daily. Ear fullness: congestion in chest, has been taking Sudafed and antihistamine. Worse when getting up in the AM. Better when lying down. Reviewed prior notes from PCP. Reviewed previous labs.     LDL Cholesterol (mg/dL)   Date Value   10/03/2022 129   09/29/2021 108   09/15/2020 123     LDL Calculated (mg/dL)   Date Value   09/05/2019 107   09/04/2018 113   09/05/2017 103       (goal LDL is <100)   AST (U/L)   Date Value   10/03/2022 21     ALT (U/L)   Date Value   10/03/2022 13     BUN (mg/dL)   Date Value   10/03/2022 16     BP Readings from Last 3 Encounters:   10/02/23 126/80   09/21/23 126/84   10/28/22 126/74          (goal 120/80)    Past Medical History:   Diagnosis Date    Hodgkin's disease ([address]) 8/17/2016      Past Surgical History:   Procedure Laterality Date    COLPOSCOPY      IR PORT PLACEMENT < 5 YEARS  2007    put in for chemo then taken out    [address] [place] [address]       Family History   Problem Relation Age of Onset    No Known Problems Mother     Cancer Father         bladder    Colon Cancer Father     Diabetes Maternal Grandmother     No Known Problems Maternal Grandfather     No Known Problems Paternal Grandmother     No Known Problems Paternal Grandfather        Social History     Tobacco Use    Smoking status: Former     Packs/day: 0.50     Years: 15.00     Additional pack years:

## 2023-10-04 ENCOUNTER — HOSPITAL ENCOUNTER (OUTPATIENT)
Age: 68
Setting detail: SPECIMEN
Discharge: HOME | End: 2023-10-04

## 2023-10-04 DIAGNOSIS — I10 ESSENTIAL HYPERTENSION: ICD-10-CM

## 2023-10-04 DIAGNOSIS — Z13.6 SCREENING FOR CARDIOVASCULAR CONDITION: ICD-10-CM

## 2023-10-04 DIAGNOSIS — Z13.1 SCREENING FOR DIABETES MELLITUS: ICD-10-CM

## 2023-10-04 LAB
ALBUMIN SERPL-MCNC: 4.4 G/DL (ref 3.5–5.2)
ALBUMIN/GLOB SERPL: 1.5 {RATIO} (ref 1–2.5)
ALP SERPL-CCNC: 97 U/L (ref 35–104)
ALT SERPL-CCNC: 13 U/L (ref 5–33)
ANION GAP SERPL CALCULATED.3IONS-SCNC: 15 MMOL/L (ref 9–17)
AST SERPL-CCNC: 20 U/L
BASOPHILS # BLD: 0.03 K/UL (ref 0–0.2)
BASOPHILS NFR BLD: 1 % (ref 0–2)
BILIRUB SERPL-MCNC: 0.3 MG/DL (ref 0.3–1.2)
BUN SERPL-MCNC: 18 MG/DL (ref 8–23)
CALCIUM SERPL-MCNC: 9.5 MG/DL (ref 8.6–10.4)
CHLORIDE SERPL-SCNC: 102 MMOL/L (ref 98–107)
CHOLEST SERPL-MCNC: 202 MG/DL
CHOLESTEROL/HDL RATIO: 3.6
CO2 SERPL-SCNC: 23 MMOL/L (ref 20–31)
CREAT SERPL-MCNC: 0.6 MG/DL (ref 0.5–0.9)
EOSINOPHIL # BLD: 0.13 K/UL (ref 0–0.44)
EOSINOPHILS RELATIVE PERCENT: 2 % (ref 1–4)
ERYTHROCYTE [DISTWIDTH] IN BLOOD BY AUTOMATED COUNT: 13.1 % (ref 11.8–14.4)
GFR SERPL CREATININE-BSD FRML MDRD: >60 ML/MIN/1.73M2
GLUCOSE SERPL-MCNC: 79 MG/DL (ref 70–99)
HCT VFR BLD AUTO: 39.4 % (ref 36.3–47.1)
HDLC SERPL-MCNC: 56 MG/DL
HGB BLD-MCNC: 12.8 G/DL (ref 11.9–15.1)
IMM GRANULOCYTES # BLD AUTO: <0.03 K/UL (ref 0–0.3)
IMM GRANULOCYTES NFR BLD: 0 %
LDLC SERPL CALC-MCNC: 115 MG/DL (ref 0–130)
LYMPHOCYTES NFR BLD: 0.88 K/UL (ref 1.1–3.7)
LYMPHOCYTES RELATIVE PERCENT: 16 % (ref 24–43)
MCH RBC QN AUTO: 29.7 PG (ref 25.2–33.5)
MCHC RBC AUTO-ENTMCNC: 32.5 G/DL (ref 28.4–34.8)
MCV RBC AUTO: 91.4 FL (ref 82.6–102.9)
MONOCYTES NFR BLD: 0.43 K/UL (ref 0.1–1.2)
MONOCYTES NFR BLD: 8 % (ref 3–12)
NEUTROPHILS NFR BLD: 73 % (ref 36–65)
NEUTS SEG NFR BLD: 3.92 K/UL (ref 1.5–8.1)
NRBC BLD-RTO: 0 PER 100 WBC
PLATELET # BLD AUTO: 379 K/UL (ref 138–453)
PMV BLD AUTO: 9.8 FL (ref 8.1–13.5)
POTASSIUM SERPL-SCNC: 4.3 MMOL/L (ref 3.7–5.3)
PROT SERPL-MCNC: 7.4 G/DL (ref 6.4–8.3)
RBC # BLD AUTO: 4.31 M/UL (ref 3.95–5.11)
SODIUM SERPL-SCNC: 140 MMOL/L (ref 135–144)
TRIGL SERPL-MCNC: 155 MG/DL
WBC OTHER # BLD: 5.4 K/UL (ref 3.5–11.3)

## 2023-10-13 ENCOUNTER — HOSPITAL ENCOUNTER (OUTPATIENT)
Dept: MAMMOGRAPHY | Age: 68
End: 2023-10-13
Payer: MEDICARE

## 2023-10-13 VITALS — WEIGHT: 129 LBS | BODY MASS INDEX: 25.32 KG/M2 | HEIGHT: 60 IN

## 2023-10-13 DIAGNOSIS — Z12.31 VISIT FOR SCREENING MAMMOGRAM: ICD-10-CM

## 2023-10-13 PROCEDURE — 77063 BREAST TOMOSYNTHESIS BI: CPT

## 2023-10-19 ENCOUNTER — PATIENT MESSAGE (OUTPATIENT)
Dept: PRIMARY CARE CLINIC | Age: 68
End: 2023-10-19

## 2023-10-19 DIAGNOSIS — M25.511 ACUTE PAIN OF RIGHT SHOULDER: Primary | ICD-10-CM

## 2023-10-20 RX ORDER — MELOXICAM 15 MG/1
15 TABLET ORAL DAILY
Qty: 30 TABLET | Refills: 0 | Status: SHIPPED | OUTPATIENT
Start: 2023-10-20

## 2023-10-20 NOTE — TELEPHONE ENCOUNTER
Akash, Processor 10/20/2023 8:56 AM EDT    Thank you for responding. We live in [place], so a location in that area would be preferred. Were you thinking of prescribing ultrasound or some other modality? Also do you feel a short course of meloxicam would help me to manage the pain better than the ibuprofen? Thank you.

## 2023-10-23 RX ORDER — ATORVASTATIN CALCIUM 10 MG/1
10 TABLET, FILM COATED ORAL DAILY
Qty: 90 TABLET | Refills: 3 | Status: SHIPPED | OUTPATIENT
Start: 2023-10-23

## 2023-10-23 NOTE — TELEPHONE ENCOUNTER
LAST VISIT:   10/2/2023     Future Appointments   Date Time Provider [address]   10/24/2023  9:30 AM Sandra Brenner PT GENNY WYATT [facility]   10/26/2023  8:00 AM FANNIE Woodward PT [facility]   12/14/2023 10:00 AM Cesario Gibbs MD [facility] Renetta Pang

## 2023-10-24 ENCOUNTER — HOSPITAL ENCOUNTER (OUTPATIENT)
Age: 68
Setting detail: THERAPIES SERIES
Discharge: HOME | End: 2023-10-24
Payer: MEDICARE

## 2023-10-24 PROCEDURE — 97140 MANUAL THERAPY 1/> REGIONS: CPT

## 2023-10-24 PROCEDURE — 97161 PT EVAL LOW COMPLEX 20 MIN: CPT

## 2023-10-24 NOTE — CONSULTS
[x] [address] [facility]  Outpatient Physical Therapy  [address]  Phone: [phone]  Fax: [phone]       Physical Therapy Evaluation    Date:  10/24/2023  Patient: Maria Esther Gutierres  : 1955  MRN: [id number]  Physician: Gume Parry DO   Insurance: [facility] MEDICARE ADVANTAGE  Medical Diagnosis: M25.511-Acute R shldr pain          ($0 co-pay/$100deductible/$3000OOP/$2890remaining)  Rehab Codes: M79.601, M25.611, M75.01, M75.41  Onset Date: 23                                 Next 's appt: 23    Subjective: This pt describes the onset of R shldr pain on or about 23 s/p three consecutive days of swimming laps in her pool at home. She describes doing the crawl stroke. Sx of pain in the R [address] joint began to radiate into the RUE and continue to do so alternating from posterolateral aspect to the anterior aspect. She describes occasional tingling of the R hand. She was prescribed a 3 day course of prednisone which reportedly did reduce her sx significantly. After that bout her sx returned and she is prescribed Meloxicam and over the the past 1-2 days it seems to be somewhat quieting her sx. She is R hand dominant. She is able to lie on R side to sleep. She has continued the AROM exercises given  to her by her physician. CC: R shldr/UE pain during normal ADL.  Stiffness of R shldr.      PMHx/Comorbidities:  [] refer to full medical chart in [facility] [] Unremarkable    [] Pacemaker [x] Cancer [x] Arthritis   [] MI/Heart Problems [] Diabetes [x] HTN   [] Obesity [] Dialysis  [] Other:   [] Asthma/COPD [] Dementia [] Other:   [] Stroke [] Sleep apnea [] Other:   [] Vascular disease [] Rheumatic disease [] Other:     Medications: [x] Refer to full medical record [] None [] Other:  Allergies:      [x] Refer to full medical record  [] None [] Other:    Tests: [] X-Ray: [] MRI:  [] Other:    Function:    Patient lives with:     In what type of home []  One story   [x] Two story   [] Split

## 2023-10-26 ENCOUNTER — HOSPITAL ENCOUNTER (OUTPATIENT)
Age: 68
Setting detail: THERAPIES SERIES
Discharge: HOME | End: 2023-10-26
Payer: MEDICARE

## 2023-10-26 PROCEDURE — 97140 MANUAL THERAPY 1/> REGIONS: CPT

## 2023-10-26 PROCEDURE — 97110 THERAPEUTIC EXERCISES: CPT

## 2023-10-26 NOTE — FLOWSHEET NOTE
manual therapy  [x] Patient would continue to benefit from skilled physical therapy services in order to: improve R shldr AROM, strength and reduce pain during normal ADL    STG (to be met in 10 treatments)  ? Pain: 2/10 maximum R GH joint pain during normal ADL  ? ROM: R shldr AROM = to L.  ? Strength: R shldr musculature strength   ? Function: Pt will report a subjective 50% decrease in ADL deficits. Independent with Home Exercise Programs  LTG (to be met in 20 treatments)  Pt will report 0/10 sx in [address] joint sx during normal ADL  Pt will attain a SPADI score of 20/130 or less to indicate improving function in ADL  Patient goals:\"pain free function\"    Pt. Education:  [x] Yes- proper shldr joint mechanics  [] No  [] Reviewed Prior HEP/Ed  Method of Education: [x] Verbal  [] Demo  [] Written  Comprehension of Education:  [x] Verbalizes understanding. [x] Demonstrates understanding. [] Needs review. [] Demonstrates/verbalizes HEP/Ed previously given.      Plan: [x] Continue current frequency toward long and short term goals on a 2x/week basis  [] Specific Instructions for subsequent treatments: n/a      Time In:0800            Time Out: 0840    Electronically signed by:  Otilio Guevara PT

## 2023-10-31 ENCOUNTER — HOSPITAL ENCOUNTER (OUTPATIENT)
Age: 68
Setting detail: THERAPIES SERIES
Discharge: HOME | End: 2023-10-31
Payer: MEDICARE

## 2023-10-31 PROCEDURE — 97110 THERAPEUTIC EXERCISES: CPT

## 2023-10-31 PROCEDURE — 97035 APP MDLTY 1+ULTRASOUND EA 15: CPT

## 2023-10-31 PROCEDURE — 97140 MANUAL THERAPY 1/> REGIONS: CPT

## 2023-10-31 NOTE — FLOWSHEET NOTE
1   [x]  Manual Therapy 8 1   []  Ther Activities     []  Neuro Re-ed     []  Vasocompression     [] Gait     []  Other     Total Treatment time/units 41 3       Assessment: [x] Progressing toward goals. [] No change. [x] Reaction to treatment - No c/o R shldr pain s/p PT today. [x] Patient would continue to benefit from skilled physical therapy services in order to: improve R shldr AROM, strength and reduce pain during normal ADL    STG (to be met in 10 treatments)  ? Pain: 2/10 maximum R GH joint pain during normal ADL  ? ROM: R shldr AROM = to L.  ? Strength: R shldr musculature strength   ? Function: Pt will report a subjective 50% decrease in ADL deficits. Independent with Home Exercise Programs  LTG (to be met in 20 treatments)  Pt will report 0/10 sx in [address] joint sx during normal ADL  Pt will attain a SPADI score of 20/130 or less to indicate improving function in ADL  Patient goals:\"pain free function\"    Pt. Education:  [x] Yes- proper shldr joint mechanics  [] No  [] Reviewed Prior HEP/Ed  Method of Education: [x] Verbal  [] Demo  [] Written  Comprehension of Education:  [x] Verbalizes understanding. [x] Demonstrates understanding. [] Needs review. [] Demonstrates/verbalizes HEP/Ed previously given. Plan: [x] Continue current frequency toward long and short term goals on a 2x/week basis  [x] Specific Instructions for subsequent treatments: increase strengthening intensity.       Time In: 0845           Time Out: 0935    Electronically signed by:  Mejia Naranjo, PT

## 2023-11-02 ENCOUNTER — HOSPITAL ENCOUNTER (OUTPATIENT)
Age: 68
Setting detail: THERAPIES SERIES
Discharge: HOME | End: 2023-11-02
Payer: MEDICARE

## 2023-11-02 PROCEDURE — 97140 MANUAL THERAPY 1/> REGIONS: CPT

## 2023-11-02 PROCEDURE — 97035 APP MDLTY 1+ULTRASOUND EA 15: CPT

## 2023-11-02 PROCEDURE — 97110 THERAPEUTIC EXERCISES: CPT

## 2023-11-02 NOTE — FLOWSHEET NOTE
[x] [address] [facility]  Outpatient Rehabilitation &  Therapy  [address] [facility] [place]: [phone]  F: [phone]             Physical Therapy Daily Treatment Note    Date:  2023  Patient Name:  Karen Wise    :  1955  MRN: [id number]  Physician: Isabel Douglas DO      Insurance: [facility] MEDICARE ADVANTAGE  Medical Diagnosis: M25.511-Acute R shldr pain   Rehab Codes: M91.044, M25.611, M75.01, M75.41   Onset Date: 23    Next Dr. Génesis Jimenez: 23  Visit# / total visits: ; Progress note for Medicare patient due at visit 10     Cancels/No Shows: 0/0    Subjective:  Pt reports she had a good day regarding sx intensity of 10/31/2023 until she picked up her 3year old granddaughter. After that her anterior R GH and proximal bicep pain and soreness. It feels like a punch in the shldr, today. Pain:  [x] Yes  [] No Location: anterior R GH joint  Pain Rating: (0-10 scale) 2/10  Pain altered Tx:  [x] No  [] Yes  Action:  Comments: R shldr IR remains mildly limited    Objective: Pt describes mild impingement sx at end range R shldr flexion. Modalities: Ultrasound over anterior R GH x 8 mins at 1.0 w/cm2 50% power   Pt declines ice today. Precautions:  Exercises:  Exercise Reps/ Time Weight/ Level Comments   Airdyne UEs only 4 mins  Seat 2   Cable tower shldr/elbow extension 2 x 10  L2.5     T-tube - R shldr ER with abduction 2 x 10  Green No pain increase   PREs - flex, abd, V's for ER+Abd 2 x 10 reps active    Armchair dips on mat 10 reps  For scapular/humeral depression strength   BATCA mid rows 2 x 15 reps  25#    BATCA lat pulls 10 reps 20# Pain-free ROM   Sink stretches  5 x 10 secs each  RUE traction   Manual therapy R GH joint  8 mins  Inferior glides, posterior glides, IR stretches.                                                                      Other: Education for self stretch for R shldr IR with towel over shldr  for increased extension, adduction and IR to

## 2023-11-06 ENCOUNTER — HOSPITAL ENCOUNTER (OUTPATIENT)
Age: 68
Setting detail: THERAPIES SERIES
Discharge: HOME | End: 2023-11-06
Payer: MEDICARE

## 2023-11-06 PROCEDURE — 97110 THERAPEUTIC EXERCISES: CPT

## 2023-11-06 PROCEDURE — 97140 MANUAL THERAPY 1/> REGIONS: CPT

## 2023-11-06 NOTE — FLOWSHEET NOTE
[x] [address] [facility]  Outpatient Rehabilitation &  Therapy  [address] [facility] [place]: [phone]  F: [phone]             Physical Therapy Daily Treatment Note    Date:  2023  Patient Name:  Deb Dietz    :  1955  MRN: [id number]  Physician: Ashley Lozano DO      Insurance: [facility] MEDICARE ADVANTAGE  Medical Diagnosis: M25.511-Acute R shldr pain   Rehab Codes: M79.601, M25.611, M75.01, M75.41   Onset Date: 23    Next  [address]: 23  Visit# / total visits: ; Progress note for Medicare patient due at visit 10     Cancels/No Shows: 0/0    Subjective:  Pt reports minimal R shldr pain today. She does have a small area of R shldr impingement while in R shldr ER + abduction and horizontal abduction as she does her hair. She states she is 75% improved regarding pain and function of the R shldr.  Pain:  [x] Yes  [] No Location: anterior R GH joint  Pain Rating: (0-10 scale) 1/10  Pain altered Tx:  [x] No  [] Yes  Action:  Comments: R shldr IR remains mildly limited    Objective: Pt describes mild impingement sx at end range R shldr flexion. Modalities: Ultrasound over anterior R GH x 8 mins at 1.0 w/cm2 50% power   Pt declines ice today. Precautions:  Exercises:  Exercise Reps/ Time Weight/ Level Comments   Airdyne UEs only 4 mins  Seat 2   Cable tower shldr/elbow extension 2 x 12 L3    T-tube - shldr extension & mid rows 2 x 15 reps Bessie     T-tube - R shldr ER with abduction 2 x 15 Green No pain increase   PREs - flex, abd, V's for ER+Abd 2 x 10 reps active Pain-free range   Armchair dips on cxhair 10 reps Chair with blue foam in seat For scapular/humeral depression strength   BATCA mid rows 2 x 15 reps  25#    BATCA lat pulls 10 reps 20# Pain-free ROM   Sink stretches  5 x 10 secs each  RUE traction   Manual therapy R GH joint  8 mins  Inferior glides, posterior glides, IR stretches.                                                                      Other: Education

## 2023-11-09 ENCOUNTER — HOSPITAL ENCOUNTER (OUTPATIENT)
Age: 68
Setting detail: THERAPIES SERIES
Discharge: HOME | End: 2023-11-09
Payer: MEDICARE

## 2023-11-09 PROCEDURE — 97110 THERAPEUTIC EXERCISES: CPT

## 2023-11-09 PROCEDURE — 97140 MANUAL THERAPY 1/> REGIONS: CPT

## 2023-11-09 NOTE — FLOWSHEET NOTE
[x] [address] [facility]  Outpatient Rehabilitation &  Therapy  [address] [facility] [place]: [phone]  F: [phone]             Physical Therapy Daily Treatment Note    Date:  2023  Patient Name:  Jordan Gupta    :  1955  MRN: [id number]  Physician: Ernesto Vela DO      Insurance: [facility] MEDICARE ADVANTAGE  Medical Diagnosis: M25.511-Acute R shldr pain   Rehab Codes: O95.203, M25.611, M75.01, M75.41   Onset Date: 23    Next  [address]: 23  Visit# / total visits: ; Progress note for Medicare patient due at visit 10     Cancels/No Shows: 0/0  *Tentative final day of PT is today* see \"Plan\"  Subjective:  Pt reports 1/10 R shldr sx today. She has been moving furniture and being active without sx increase. She continues to take her Meloxicam and has 1 week left on her prescription. Pain:  [x] Yes  [] No Location: anterior R GH joint  Pain Rating: (0-10 scale) 1/10  Pain altered Tx:  [x] No  [] Yes  Action:  Comments: R shldr IR remains mildly limited    Objective: Pt describes mild impingement sx at end range R shldr flexion. SPADI score = 6/130 = 5% impairment  Modalities: Ultrasound over anterior R GH x 6  mins at 1.0 w/cm2 50% power   Pt declines ice today. Precautions:  Exercises:  Exercise Reps/ Time Weight/ Level Comments   Brandon Olvera only 5 mins  Seat 2   Cable tower shldr/elbow extension 2 x 12 L3    T-tube - shldr extension & mid rows 2 x 15 reps Green     T-tube - R shldr ER with abduction 2 x 15 Green No pain increase   PREs - flex, abd, V's for ER+Abd 2 x 10 reps active Pain-free range   Armchair dips on chair 10 reps Chair with blue foam in seat For scapular/humeral depression strength   BATCA mid rows 2 x 15 reps  25#    BATCA lat pulls NP 20# Pain-free ROM   Bicep curls 30 reps 6#    Horizontal shldr abduction with t-tube 2 x 15 reps  Blue    Manual therapy R GH joint  8 mins  Inferior glides, posterior glides, IR stretches.                Corner stretches

## 2023-11-13 ENCOUNTER — HOSPITAL ENCOUNTER (OUTPATIENT)
Age: 68
Setting detail: THERAPIES SERIES
Discharge: HOME | End: 2023-11-13
Payer: MEDICARE

## 2023-11-13 ENCOUNTER — APPOINTMENT (OUTPATIENT)
Age: 68
End: 2023-11-13
Payer: MEDICARE

## 2023-11-13 NOTE — FLOWSHEET NOTE
[x] [address]: [phone]  F: [phone]             Therapy Cancel/No Show note    Date: 2023  Patient: Gerda Lindsay  : 1955  MRN: [id number]    Cancels/No Shows to date:     For today's appointment patient:    [x]  Cancelled    [] Rescheduled appointment    [] No-show     Reason given by patient:    []  Patient ill    []  Conflicting appointment    [] No transportation      [] Conflict with work    [] No reason given    [] Weather related    [] [id number]    [x] Other:   Doing well. PT not needed today.    Comments:        [x] Next appointment was confirmed    Electronically signed by: Viola Soto PT

## 2023-11-16 ENCOUNTER — APPOINTMENT (OUTPATIENT)
Age: 68
End: 2023-11-16
Payer: MEDICARE

## 2023-12-11 SDOH — HEALTH STABILITY: PHYSICAL HEALTH: ON AVERAGE, HOW MANY DAYS PER WEEK DO YOU ENGAGE IN MODERATE TO STRENUOUS EXERCISE (LIKE A BRISK WALK)?: 3 DAYS

## 2023-12-11 ASSESSMENT — LIFESTYLE VARIABLES
HOW OFTEN DURING THE LAST YEAR HAVE YOU HAD A FEELING OF GUILT OR REMORSE AFTER DRINKING: 0
HOW OFTEN DO YOU HAVE SIX OR MORE DRINKS ON ONE OCCASION: 1
HOW MANY STANDARD DRINKS CONTAINING ALCOHOL DO YOU HAVE ON A TYPICAL DAY: 1
HAVE YOU OR SOMEONE ELSE BEEN INJURED AS A RESULT OF YOUR DRINKING: NO
HOW OFTEN DURING THE LAST YEAR HAVE YOU FOUND THAT YOU WERE NOT ABLE TO STOP DRINKING ONCE YOU HAD STARTED: 0
HOW OFTEN DURING THE LAST YEAR HAVE YOU FOUND THAT YOU WERE NOT ABLE TO STOP DRINKING ONCE YOU HAD STARTED: NEVER
HAS A RELATIVE, FRIEND, DOCTOR, OR ANOTHER HEALTH PROFESSIONAL EXPRESSED CONCERN ABOUT YOUR DRINKING OR SUGGESTED YOU CUT DOWN: NO
HOW OFTEN DO YOU HAVE A DRINK CONTAINING ALCOHOL: 4 OR MORE TIMES A WEEK
HOW OFTEN DURING THE LAST YEAR HAVE YOU FAILED TO DO WHAT WAS NORMALLY EXPECTED FROM YOU BECAUSE OF DRINKING: 0
HOW OFTEN DURING THE LAST YEAR HAVE YOU NEEDED AN ALCOHOLIC DRINK FIRST THING IN THE MORNING TO GET YOURSELF GOING AFTER A NIGHT OF HEAVY DRINKING: NEVER
HOW OFTEN DURING THE LAST YEAR HAVE YOU FAILED TO DO WHAT WAS NORMALLY EXPECTED FROM YOU BECAUSE OF DRINKING: NEVER
HOW OFTEN DURING THE LAST YEAR HAVE YOU BEEN UNABLE TO REMEMBER WHAT HAPPENED THE NIGHT BEFORE BECAUSE YOU HAD BEEN DRINKING: NEVER
HOW MANY STANDARD DRINKS CONTAINING ALCOHOL DO YOU HAVE ON A TYPICAL DAY: 1 OR 2
HOW OFTEN DURING THE LAST YEAR HAVE YOU HAD A FEELING OF GUILT OR REMORSE AFTER DRINKING: NEVER
HOW OFTEN DURING THE LAST YEAR HAVE YOU NEEDED AN ALCOHOLIC DRINK FIRST THING IN THE MORNING TO GET YOURSELF GOING AFTER A NIGHT OF HEAVY DRINKING: 0
HAVE YOU OR SOMEONE ELSE BEEN INJURED AS A RESULT OF YOUR DRINKING: 0
HOW OFTEN DURING THE LAST YEAR HAVE YOU BEEN UNABLE TO REMEMBER WHAT HAPPENED THE NIGHT BEFORE BECAUSE YOU HAD BEEN DRINKING: 0
HAS A RELATIVE, FRIEND, DOCTOR, OR ANOTHER HEALTH PROFESSIONAL EXPRESSED CONCERN ABOUT YOUR DRINKING OR SUGGESTED YOU CUT DOWN: 0
HOW OFTEN DO YOU HAVE A DRINK CONTAINING ALCOHOL: 5

## 2023-12-11 ASSESSMENT — PATIENT HEALTH QUESTIONNAIRE - PHQ9
SUM OF ALL RESPONSES TO PHQ QUESTIONS 1-9: 0
SUM OF ALL RESPONSES TO PHQ9 QUESTIONS 1 & 2: 0
SUM OF ALL RESPONSES TO PHQ QUESTIONS 1-9: 0
1. LITTLE INTEREST OR PLEASURE IN DOING THINGS: 0
SUM OF ALL RESPONSES TO PHQ QUESTIONS 1-9: 0
2. FEELING DOWN, DEPRESSED OR HOPELESS: 0
SUM OF ALL RESPONSES TO PHQ QUESTIONS 1-9: 0

## 2023-12-14 ENCOUNTER — OFFICE VISIT (OUTPATIENT)
Dept: PRIMARY CARE CLINIC | Age: 68
End: 2023-12-14
Payer: MEDICARE

## 2023-12-14 VITALS
OXYGEN SATURATION: 99 % | HEIGHT: 60 IN | HEART RATE: 77 BPM | SYSTOLIC BLOOD PRESSURE: 112 MMHG | BODY MASS INDEX: 25.52 KG/M2 | DIASTOLIC BLOOD PRESSURE: 80 MMHG | WEIGHT: 130 LBS

## 2023-12-14 DIAGNOSIS — M25.511 ACUTE PAIN OF RIGHT SHOULDER: ICD-10-CM

## 2023-12-14 DIAGNOSIS — Z00.00 MEDICARE ANNUAL WELLNESS VISIT, SUBSEQUENT: Primary | ICD-10-CM

## 2023-12-14 PROCEDURE — G0439 PPPS, SUBSEQ VISIT: HCPCS | Performed by: FAMILY MEDICINE

## 2023-12-14 PROCEDURE — 3079F DIAST BP 80-89 MM HG: CPT | Performed by: FAMILY MEDICINE

## 2023-12-14 PROCEDURE — 1123F ACP DISCUSS/DSCN MKR DOCD: CPT | Performed by: FAMILY MEDICINE

## 2023-12-14 PROCEDURE — 3074F SYST BP LT 130 MM HG: CPT | Performed by: FAMILY MEDICINE

## 2023-12-14 NOTE — PATIENT INSTRUCTIONS
Personalized Preventive Plan for Arun Carrera - 12/14/2023  Medicare offers a range of preventive health benefits. Some of the tests and screenings are paid in full while other may be subject to a deductible, co-insurance, and/or copay. Some of these benefits include a comprehensive review of your medical history including lifestyle, illnesses that may run in your family, and various assessments and screenings as appropriate. After reviewing your medical record and screening and assessments performed today your provider may have ordered immunizations, labs, imaging, and/or referrals for you. A list of these orders (if applicable) as well as your Preventive Care list are included within your After Visit Summary for your review. Other Preventive Recommendations:    A preventive eye exam performed by an eye specialist is recommended every 1-2 years to screen for glaucoma; cataracts, macular degeneration, and other eye disorders. A preventive dental visit is recommended every 6 months. Try to get at least 150 minutes of exercise per week or 10,000 steps per day on a pedometer . Order or download the FREE \"Exercise & Physical Activity: Your Everyday Guide\" from [employer]. Call 1-275.181.9335 or search [employer] online. You need 5947-0009 mg of calcium and 6125-4394 IU of vitamin D per day. It is possible to meet your calcium requirement with diet alone, but a vitamin D supplement is usually necessary to meet this goal.  When exposed to the sun, use a sunscreen that protects against both UVA and UVB radiation with an SPF of 30 or greater. Reapply every 2 to 3 hours or after sweating, drying off with a towel, or swimming. Always wear a seat belt when traveling in a car. Always wear a helmet when riding a bicycle or motorcycle.

## 2023-12-26 ENCOUNTER — HOSPITAL ENCOUNTER (OUTPATIENT)
Age: 68
Setting detail: THERAPIES SERIES
Discharge: HOME | End: 2023-12-26
Payer: MEDICARE

## 2023-12-26 PROCEDURE — 97110 THERAPEUTIC EXERCISES: CPT

## 2023-12-26 PROCEDURE — 97140 MANUAL THERAPY 1/> REGIONS: CPT

## 2023-12-26 PROCEDURE — 97035 APP MDLTY 1+ULTRASOUND EA 15: CPT

## 2023-12-26 NOTE — FLOWSHEET NOTE
[x] [address]: [phone]  F: [phone]             Physical Therapy Daily Treatment Note / Progress Note    Date:  2023  Patient Name:  Odette Nguyen    :  1955  MRN: [id number]  Physician: mEile Bartlett DO      Insurance: [facility] MEDICARE ADVANTAGE  Medical Diagnosis: M25.511-Acute R shldr pain   Rehab Codes: M79.601, M25.611, M75.01, M75.41   Onset Date: 23    Next Dr. Holbrook Smoker: unknown  Visit# / total visits: ; Progress note for Medicare patient due at visit 10     Cancels/No Shows: 0/0    Subjective:  Pt returns today due to persistent R GH joint pain and stiffness. She states she had been doing fairly well with pain and motion until she raked leaves for an extended time. Today she reports difficulty with AROM at ends of range. She has no significant resting pain, but motion increases her sx. Pain:  [x] Yes  [] No Location: anterior R GH joint  Pain Rating: (0-10 scale) 2/10  Pain altered Tx:  [x] No  [] Yes  Action:  Comments: R shldr IR remains mildly limited    Objective: (23) R shldr AROM = flexion 155, abduction 155, ER 60, IR 55, horizontal adduction is R index finger tip to L A-C joint. Capsular pattern of motion noted. Modalities: Ultrasound over anterior R GH x 8  mins at 1.0 w/cm2, 50% power   Pt declines ice today.   Precautions:  Exercises: NP=Not Performed  Exercise Reps/ Time Weight/ Level Comments   Airdyne UEs only 5 mins  Seat 2   Cable tower shldr/elbow extension 2 x 12 L3 NP   T-tube - shldr extension & mid rows 2 x 15 reps Green     T-tube - R shldr ER with abduction 2 x 15 Green No pain increase   PREs - flex, abd, V's for ER+Abd 2 x 10 reps active Pain-free range   Armchair dips on chair 10 reps  NP Chair with blue foam in seat For scapular/humeral depression strength   BATCA mid rows 2 x 15 reps  25#    BATCA lat pulls NP 20# Pain-free ROM   Bicep curls 30 reps 6# NP

## 2023-12-28 ENCOUNTER — HOSPITAL ENCOUNTER (OUTPATIENT)
Age: 68
Setting detail: THERAPIES SERIES
Discharge: HOME | End: 2023-12-28
Payer: MEDICARE

## 2023-12-28 PROCEDURE — 97110 THERAPEUTIC EXERCISES: CPT

## 2023-12-28 PROCEDURE — 97140 MANUAL THERAPY 1/> REGIONS: CPT

## 2023-12-28 PROCEDURE — 97035 APP MDLTY 1+ULTRASOUND EA 15: CPT

## 2023-12-28 NOTE — FLOWSHEET NOTE
[x] [facility]  Outpatient Rehabilitation &  Therapy  [address]: [phone]  F: [phone]             Physical Therapy Daily Treatment Note     Date:  2023  Patient Name:  Ana Bledsoe    :  1955  MRN: [id number]  Physician: Richard Ruvalcaba DO      Insurance: [facility] MEDICARE ADVANTAGE  Medical Diagnosis: M25.511-Acute R shldr pain   Rehab Codes: M79.601, M25.611, M75.01, M75.41   Onset Date: 23    Next Dr. Izzy Almonte: unknown  Visit# / total visits: ; Progress note for Medicare patient due at visit 10     Cancels/No Shows: 0/0    Subjective:  Pt reports mild R shldr tightness and discomfort. She felt good after last session. Pain:  [x] Yes  [] No Location: anterior R GH joint  Pain Rating: (0-10 scale) 2/10  Pain altered Tx:  [x] No  [] Yes  Action:  Comments: R shldr IR remains mildly limited    Objective: (23) R shldr AROM = flexion 155, abduction 155, ER 60, IR 55, horizontal adduction is R index finger tip to L A-C joint. Capsular pattern of motion noted. Modalities: Ultrasound over anterior R GH x 8  mins at 1.0 w/cm2, 50% power   Pt declines ice today. Precautions:  Exercises: NP=Not Performed  Exercise Reps/ Time Weight/ Level Comments   Airdyne UEs only 5 mins  Seat 2   Cable tower shldr/elbow extension 2 x 12 L3 NP   T-tube - shldr extension & mid rows 2 x 15 reps Green     T-tube - R shldr ER with abduction 2 x 15 Green No pain increase   PREs - flex, abd, V's for ER+Abd 2 x 10 reps active Pain-free range   Armchair dips on chair 10 reps  NP Chair with blue foam in seat For scapular/humeral depression strength   BATCA mid rows 2 x 15 reps  25#    BATCA lat pulls NP 20# Pain-free ROM   Bicep curls 30 reps 6# NP   Horizontal shldr abduction with t-tube 2 x 15 reps  Blue NP   Manual therapy R GH joint  10 mins  Inferior glides, posterior glides, IR stretches.                Corner stretches  5 x 15 secs

## 2024-01-02 ENCOUNTER — HOSPITAL ENCOUNTER (OUTPATIENT)
Age: 69
Setting detail: THERAPIES SERIES
Discharge: HOME | End: 2024-01-02
Payer: MEDICARE

## 2024-01-02 PROCEDURE — 97110 THERAPEUTIC EXERCISES: CPT

## 2024-01-02 PROCEDURE — 97035 APP MDLTY 1+ULTRASOUND EA 15: CPT

## 2024-01-02 PROCEDURE — 97140 MANUAL THERAPY 1/> REGIONS: CPT

## 2024-01-02 NOTE — FLOWSHEET NOTE
[x] [facility]  Outpatient Rehabilitation &  Therapy  [address] [facility].   P: [phone]  F: [phone]             Physical Therapy Daily Treatment Note     Date:  2024  Patient Name:  Concepción Montes    :  1955  MRN: [id number]  Physician: Almaz Vera DO      Insurance: [facility] MEDICARE ADVANTAGE  Medical Diagnosis: M25.511-Acute R shldr pain   Rehab Codes: M79.601, M25.611, M75.01, M75.41   Onset Date: 23    Next  Appt: unknown  Visit# / total visits: ; Progress note for Medicare patient due at visit 10     Cancels/No Shows: 0/0    Subjective:  Pt reports R humeral pain especially at the insertion of supraspinatus at ends of range, but she perceives increasing AROM of R shldr.  Pain:  [x] Yes  [] No Location: anterior R GH joint  Pain Rating: (0-10 scale) 2/10  Pain altered Tx:  [x] No  [] Yes  Action:  Comments: R shldr IR remains mildly limited    Objective: (23) R shldr AROM = flexion 155, abduction 155, ER 60, IR 55, horizontal adduction is R index finger tip to L A-C joint. Capsular pattern of motion noted.  Modalities: Ultrasound over anterior R GH x 8  mins at 1.0 w/cm2, 50% power   Pt declines ice today.  Precautions:  Exercises: NP=Not Performed  Exercise Reps/ Time Weight/ Level Comments   Airdyne UEs only 5 mins  Seat 2   Cable tower shldr/elbow extension 2 x 12 L3 NP   T-tube - shldr extension & mid rows 2 x 15 reps Green     T-tube - R shldr ER with abduction 2 x 15 Green No pain increase   PREs - flex, abd, V's for ER+Abd 2 x 10 reps active Pain-free range   Armchair dips on chair 10 reps  NP Chair with blue foam in seat For scapular/humeral depression strength   BATCA mid rows 2 x 15 reps  25#    BATCA lat pulls NP 20# Pain-free ROM   Bicep curls 30 reps 6# NP   Horizontal shldr abduction with t-tube 2 x 15 reps  Blue NP   Manual therapy R GH joint  10 mins  Inferior glides, posterior glides, IR stretches.               Corner stretches  5 x

## 2024-01-04 ENCOUNTER — HOSPITAL ENCOUNTER (OUTPATIENT)
Age: 69
Setting detail: THERAPIES SERIES
Discharge: HOME | End: 2024-01-04
Payer: MEDICARE

## 2024-01-04 PROCEDURE — 97140 MANUAL THERAPY 1/> REGIONS: CPT

## 2024-01-04 PROCEDURE — 97110 THERAPEUTIC EXERCISES: CPT

## 2024-01-04 PROCEDURE — 97035 APP MDLTY 1+ULTRASOUND EA 15: CPT

## 2024-01-04 NOTE — FLOWSHEET NOTE
[x] [facility]  Outpatient Rehabilitation &  Therapy  [address] [facility].   P: [phone]  F: [phone]             Physical Therapy Daily Treatment Note     Date:  2024  Patient Name:  Concepción Montes    :  1955  MRN: [id number]  Physician: Almaz Vera DO      Insurance: [facility] MEDICARE ADVANTAGE  Medical Diagnosis: M25.511-Acute R shldr pain   Rehab Codes: M79.601, M25.611, M75.01, M75.41   Onset Date: 23    Next  Appt: unknown  Visit# / total visits: ; Progress note for Medicare patient due at visit 10     Cancels/No Shows: 0/0    Subjective:  Pt reports anterolateral R GH joint pain. Pain awakens her at night when she changes position in bed.  Pain:  [x] Yes  [] No Location: anterior R GH joint  Pain Rating: (0-10 scale) 2/10  Pain altered Tx:  [x] No  [] Yes  Action:  Comments: R shldr IR remains mildly limited    Objective: (23) R shldr AROM = flexion 155, abduction 155, ER 60, IR 55, horizontal adduction is R index finger tip to L A-C joint. Capsular pattern of motion noted.  Modalities: Ultrasound over anterior R GH x 8  mins at 1.0 w/cm2, 50% power   Pt declines ice today.  Precautions:  Exercises: NP=Not Performed  Exercise Reps/ Time Weight/ Level Comments   Airdyne  5 mins  Seat 2   Cable tower shldr/elbow extension 2 x 12 L3 NP   T-tube - shldr extension & mid rows 2 x 15 reps Green     T-tube - R shldr ER with abduction 2 x 15 Green No pain increase   PREs - flex, abd, V's for ER+Abd 2 x 10 reps active Pain-free range   Armchair dips on chair 10 reps   Chair with blue foam in seat For scapular/humeral depression strength   BATCA mid rows 2 x 15 reps  25#    BATCA lat pulls NP 20# Pain-free ROM   Bicep curls 30 reps 6# NP   Horizontal shldr abduction with t-tube 2 x 15 reps  Blue NP   Manual therapy R GH joint  10 mins  Inferior glides, posterior glides, IR stretches.               Corner stretches  5 x 15 secs  NP

## 2024-01-08 ENCOUNTER — HOSPITAL ENCOUNTER (OUTPATIENT)
Age: 69
Setting detail: THERAPIES SERIES
Discharge: HOME | End: 2024-01-08
Payer: MEDICARE

## 2024-01-08 PROCEDURE — 97110 THERAPEUTIC EXERCISES: CPT

## 2024-01-08 PROCEDURE — 97035 APP MDLTY 1+ULTRASOUND EA 15: CPT

## 2024-01-08 NOTE — FLOWSHEET NOTE
[x] [facility]  Outpatient Rehabilitation &  Therapy  [address] [facility].   P: [phone]  F: [phone]             Physical Therapy Daily Treatment Note     Date:  2024  Patient Name:  Concepción Montes    :  1955  MRN: [id number]  Physician: Almaz Vera DO      Insurance: [facility] MEDICARE ADVANTAGE  Medical Diagnosis: M25.511-Acute R shldr pain   Rehab Codes: M79.601, M25.611, M75.01, M75.41   Onset Date: 23    Next  Appt: unknown  Visit# / total visits: 10/20; Progress note for Medicare patient due at visit 17 (last progress note at visit 7)     Cancels/No Shows: 0/0    Subjective:  Pt reports no anterolateral R GH joint pain. She states she has been more diligent with her home ex rx and results have been more AROM with less pain.  Pain:  [x] Yes  [x] No Location: n/a  Pain Rating: (0-10 scale) 0/10  Pain altered Tx:  [x] No  [] Yes  Action:  Comments: R shldr IR remains mildly limited    Objective: (23) R shldr AROM = flexion 155, abduction 155, ER 60, IR 55, horizontal adduction is R index finger tip to L A-C joint. Capsular pattern of motion noted.  Modalities: Ultrasound over anterior R GH x 8  mins at 1.0 w/cm2, 50% power   Pt declines ice today.  Precautions:  Exercises: NP=Not Performed  Exercise Reps/ Time Weight/ Level Comments   Airdyne  5 mins  Seat 2   Cable tower shldr/elbow extension 2 x 12 L2.5    T-tube - shldr extension & mid rows 2 x 15 reps Green NP    T-tube - R shldr ER with abduction 2 x 15 Green No pain increase   PREs - flex, abd, V's for ER+Abd 2 x 10 reps active Pain-free range   Armchair dips on chair 10 reps   Chair with blue foam in seat For scapular/humeral depression strength   BATCA mid rows 2 x 15 reps  25#    BATCA lat pulls 12 reps 20# Pain-free ROM   Bicep curls 30 reps 6# NP   Horizontal shldr abduction with t-tube 2 x 15 reps  Blue    Manual therapy R GH joint  5 mins  Inferior glides, posterior glides, IR stretches.

## 2024-01-11 ENCOUNTER — PATIENT MESSAGE (OUTPATIENT)
Dept: PRIMARY CARE CLINIC | Age: 69
End: 2024-01-11

## 2024-01-11 ENCOUNTER — HOSPITAL ENCOUNTER (OUTPATIENT)
Age: 69
Setting detail: THERAPIES SERIES
Discharge: HOME | End: 2024-01-11
Payer: MEDICARE

## 2024-01-11 DIAGNOSIS — M25.511 ACUTE PAIN OF RIGHT SHOULDER: ICD-10-CM

## 2024-01-11 PROCEDURE — 97110 THERAPEUTIC EXERCISES: CPT

## 2024-01-11 PROCEDURE — 97035 APP MDLTY 1+ULTRASOUND EA 15: CPT

## 2024-01-11 NOTE — FLOWSHEET NOTE
[x] [facility]  Outpatient Rehabilitation &  Therapy  [address] [facility].   P: [phone]  F: [phone]             Physical Therapy Daily Treatment Note     Date:  2024  Patient Name:  Concepción Montes    :  1955  MRN: [id number]  Physician: Almaz Vera DO      Insurance: [facility] MEDICARE ADVANTAGE  Medical Diagnosis: M25.511-Acute R shldr pain   Rehab Codes: M79.601, M25.611, M75.01, M75.41   Onset Date: 23    Next  Appt: unknown  Visit# / total visits: ; Progress note for Medicare patient due at visit 17 (last progress note at visit 7)     Cancels/No Shows: 0/0    Subjective:  Pt reports increased R shldr tightness today due to cold weather.  Pain:  [x] Yes  [] No Location: n/a  Pain Rating: (0-10 scale) 2/10  Pain altered Tx:  [x] No  [] Yes  Action:  Comments: R shldr IR remains mildly limited    Objective: (23) R shldr AROM = flexion 155, abduction 155, ER 60, IR 55, horizontal adduction is R index finger tip to L A-C joint. Capsular pattern of motion noted.  Modalities: Ultrasound over anterior R GH x 8  mins at 1.0 w/cm2, 50% power   Pt declines ice today.  Precautions:  Exercises: NP=Not Performed  Exercise Reps/ Time Weight/ Level Comments   Airdyne  5 mins  Seat 2   Cable tower shldr/elbow extension 2 x 12 L2.5    T-tube - shldr extension & mid rows 2 x 15 reps Green NP    T-tube - R shldr ER with abduction 2 x 15 Green No pain increase   PREs - flex, abd, V's for ER+Abd 2 x 10 reps active Pain-free range   Armchair dips on chair 10 reps   Chair with blue foam in seat For scapular/humeral depression strength   BATCA mid rows 2 x 15 reps  25#    BATCA lat pulls 12 reps 20# Pain-free ROM   Bicep curls 30 reps 6#    Horizontal shldr abduction with t-tube 2 x 15 reps  Blue    Manual therapy R GH joint  5 mins  Inferior glides, posterior glides, IR stretches.               Corner stretches  5 x 15 secs

## 2024-01-11 NOTE — TELEPHONE ENCOUNTER
From: Concepción Montes  To: Dr. Kathy Gibbs  Sent: 1/11/2024 1:31 PM EST  Subject: Rotator cuff therapy for my right shoulder    I was discussing with my therapist whether he thought another course of anti-inflammatory treatment would be a benefit, and he acknowledged that he thought it probably would. This is after 3 weeks of PT this time.   Thank you

## 2024-01-12 RX ORDER — MELOXICAM 15 MG/1
15 TABLET ORAL DAILY
Qty: 30 TABLET | Refills: 0 | Status: SHIPPED | OUTPATIENT
Start: 2024-01-12

## 2024-01-22 ENCOUNTER — HOSPITAL ENCOUNTER (OUTPATIENT)
Age: 69
Setting detail: THERAPIES SERIES
Discharge: HOME | End: 2024-01-22
Payer: MEDICARE

## 2024-01-22 PROCEDURE — 97035 APP MDLTY 1+ULTRASOUND EA 15: CPT

## 2024-01-22 PROCEDURE — 97110 THERAPEUTIC EXERCISES: CPT

## 2024-01-22 NOTE — FLOWSHEET NOTE
[x] [facility]  Outpatient Rehabilitation &  Therapy  [address] [facility].   P: [phone]  F: [phone]             Physical Therapy Daily Treatment Note     Date:  2024  Patient Name:  Concepción Montes    :  1955  MRN: [id number]  Physician: Almaz Vera DO      Insurance: [facility] MEDICARE ADVANTAGE  Medical Diagnosis: M25.511-Acute R shldr pain   Rehab Codes: M79.601, M25.611, M75.01, M75.41   Onset Date: 23    Next  Appt: unknown  Visit# / total visits: ; Progress note for Medicare patient due at visit 17 (last progress note at visit 7)     Cancels/No Shows: 0/0    Subjective:  Pt reports she thinks her R shldr is moving better since she resumed her Meloxicam. She has far less pain over the anterior R GH joint.  Pain:  [x] Yes  [] No Location: n/a  Pain Rating: (0-10 scale) 1/10  Pain altered Tx:  [x] No  [] Yes  Action:  Comments: R shldr IR remains mildly limited in ER and IR    Objective: (24) R shldr AROM = flexion 165, abduction 165, ER 70, IR 65, horizontal adduction is R index finger tip to L A-C joint. Capsular pattern of motion noted.  Modalities: Ultrasound over anterior R GH x 8  mins at 1.0 w/cm2, 50% power   Pt declines ice today.  Precautions: n/a  Exercises: NP=Not Performed  Exercise Reps/ Time Weight/ Level Comments   Airdyne  5 mins  Seat 2   Cable tower shldr/elbow extension 2 x 12 L2.5    T-tube - shldr extension & mid rows 2 x 15 reps Green NP    T-tube - R shldr ER with abduction 2 x 15 Green No pain increase   PREs - flex, abd, V's for ER+Abd 2 x 10 reps active Pain-free range   Armchair dips on chair 10 reps   Chair with blue foam in seat For scapular/humeral depression strength   BATCA mid rows 2 x 15 reps  25#    BATCA lat pulls 2x10 reps 20# Pain-free ROM   Bicep curls 30 reps 6#    Horizontal shldr abduction with t-tube 2 x 15 reps  Blue    Manual therapy R GH joint  5 mins  Inferior glides, posterior glides, IR stretches.

## 2024-01-25 ENCOUNTER — HOSPITAL ENCOUNTER (OUTPATIENT)
Age: 69
Setting detail: THERAPIES SERIES
Discharge: HOME | End: 2024-01-25
Payer: MEDICARE

## 2024-01-25 PROCEDURE — 97140 MANUAL THERAPY 1/> REGIONS: CPT

## 2024-01-25 PROCEDURE — 97110 THERAPEUTIC EXERCISES: CPT

## 2024-01-25 PROCEDURE — 97035 APP MDLTY 1+ULTRASOUND EA 15: CPT

## 2024-01-25 NOTE — FLOWSHEET NOTE
[x] [facility]  Outpatient Rehabilitation &  Therapy  [address] [facility].   P: [phone]  F: [phone]             Physical Therapy Daily Treatment Note     Date:  2024  Patient Name:  Concepción Montes    :  1955  MRN: [id number]  Physician: Almaz Vera DO      Insurance: [facility] MEDICARE ADVANTAGE  Medical Diagnosis: M25.511-Acute R shldr pain   Rehab Codes: M79.601, M25.611, M75.01, M75.41   Onset Date: 23    Next  Appt: unknown  Visit# / total visits: ; Progress note for Medicare patient due at visit 17 (last progress note at visit 7)     Cancels/No Shows: 0/0    Subjective:  Pt reports she has done more R shldr ROM exercises and feels sore, but not painful. She feels the R shldr motion is getting easier.  Pain:  [x] Yes  [] No Location: n/a  Pain Rating: (0-10 scale) 1/10  Pain altered Tx:  [x] No  [] Yes  Action:  Comments: R shldr IR remains mildly limited in ER and IR    Objective: (24) R shldr AROM = flexion 165, abduction 165, ER 70, IR 65, horizontal adduction is R index finger tip to L A-C joint. Capsular pattern of motion noted.  Modalities: Ultrasound over anterior R GH x 8  mins at 1.0 w/cm2, 50% power   Pt declines ice today.  Precautions: n/a  Exercises: NP=Not Performed  Exercise Reps/ Time Weight/ Level Comments   Airdyne  5 mins  Seat 2   Cable tower shldr/elbow extension 2 x 12 L2.5    T-tube - shldr extension & mid rows 2 x 15 reps Green NP    T-tube - R shldr ER with abduction 2 x 15 Green No pain increase   PREs - flex, abd, V's for ER+Abd 2 x 10 reps active Pain-free range   Armchair dips on chair 10 reps   Chair with blue foam in seat For scapular/humeral depression strength   BATCA mid rows 2 x 15 reps  25#    BATCA lat pulls 2x10 reps 20# Pain-free ROM   Bicep curls 30 reps 6# NP   Horizontal shldr abduction with t-tube 2 x 15 reps  Blue    Manual therapy R GH joint  5 mins  Inferior glides, posterior glides, IR stretches.

## 2024-01-29 ENCOUNTER — HOSPITAL ENCOUNTER (OUTPATIENT)
Age: 69
Setting detail: THERAPIES SERIES
Discharge: HOME | End: 2024-01-29
Payer: MEDICARE

## 2024-01-29 PROCEDURE — 97110 THERAPEUTIC EXERCISES: CPT

## 2024-01-29 PROCEDURE — 97035 APP MDLTY 1+ULTRASOUND EA 15: CPT

## 2024-01-29 NOTE — FLOWSHEET NOTE
5 mins NP Inferior glides, posterior glides, IR stretches.               Corner stretches  5 x 15 secs NP                                                    Other: Reviewed education for self stretch for R shldr in supine for butterfly wings for home use.      Treatment Charges: Mins Units   [x]  Modalities-US 8 1   [x]  Ther Exercise 20 1   []  Manual Therapy     []  Ther Activities     []  Neuro Re-ed     []  Vasocompression     [] Gait     []  Other     Total Treatment time/units 28 2       Assessment: [x] R GH joint AROM has returned to normal and is equal to L shldr except for IR/ext/add motion to reach her low back, but this is equal in AAROM. Strength is grossly 4+/5 for bilat shldrs    [] No change.     [x] Reaction to treatment - Pt reports no R GH joint pain post session.  [x] Patient would continue to benefit from continuing her home ex rx in order to improve R shldr AROM, strength and reduce pain during normal ADL    STG (to be met in 10 treatments)  ? Pain: 2/10 maximum R GH joint pain during normal ADL - Goal met  ? ROM: R shldr AROM = to L. - Goal met   ? Strength: R shldr musculature strength  - Goal met  ? Function: Pt will report a subjective 50% decrease in ADL deficits. - Goal met  Independent with Home Exercise Programs - Goal met  LTG (to be met in 20 treatments)  Pt will report 0/10 sx in RUE/GH joint sx during normal ADL - Goal met  Pt will attain a SPADI score of 20/130 or less to indicate improving function in ADL - Goal met   Patient goals:\"pain free function\" - Progressing    Pt. Education:  [x] Yes- Plan of care  [] No  [] Reviewed Prior HEP/Ed  Method of Education: [x] Verbal  [] Demo  [] Written  Comprehension of Education:  [x] Verbalizes understanding.  [] Demonstrates understanding.  [] Needs review.  [] Demonstrates/verbalizes HEP/Ed previously given.     Plan: [x] Will keep this pt's file open until 2/16/24 and if she chooses to not return she will discharged  from our active

## 2024-02-23 ENCOUNTER — PATIENT MESSAGE (OUTPATIENT)
Dept: PRIMARY CARE CLINIC | Age: 69
End: 2024-02-23

## 2024-02-26 RX ORDER — ACYCLOVIR 50 MG/G
OINTMENT TOPICAL
Qty: 15 G | Refills: 1 | Status: SHIPPED | OUTPATIENT
Start: 2024-02-26 | End: 2024-03-04

## 2024-06-12 ENCOUNTER — HOSPITAL ENCOUNTER (OUTPATIENT)
Age: 69
Setting detail: SPECIMEN
Discharge: HOME | End: 2024-06-12

## 2024-06-12 ENCOUNTER — OFFICE VISIT (OUTPATIENT)
Dept: PRIMARY CARE CLINIC | Age: 69
End: 2024-06-12
Payer: MEDICARE

## 2024-06-12 VITALS
WEIGHT: 138 LBS | SYSTOLIC BLOOD PRESSURE: 146 MMHG | BODY MASS INDEX: 27.82 KG/M2 | HEART RATE: 89 BPM | OXYGEN SATURATION: 98 % | DIASTOLIC BLOOD PRESSURE: 96 MMHG | HEIGHT: 59 IN

## 2024-06-12 DIAGNOSIS — I10 ESSENTIAL HYPERTENSION: Primary | ICD-10-CM

## 2024-06-12 DIAGNOSIS — I10 ESSENTIAL HYPERTENSION: ICD-10-CM

## 2024-06-12 LAB
ANION GAP SERPL CALCULATED.3IONS-SCNC: 12 MMOL/L (ref 9–16)
BUN SERPL-MCNC: 10 MG/DL (ref 8–23)
CALCIUM SERPL-MCNC: 9.7 MG/DL (ref 8.6–10.4)
CHLORIDE SERPL-SCNC: 103 MMOL/L (ref 98–107)
CO2 SERPL-SCNC: 26 MMOL/L (ref 20–31)
CREAT SERPL-MCNC: 0.6 MG/DL (ref 0.5–0.9)
GFR, ESTIMATED: >90 ML/MIN/1.73M2
GLUCOSE SERPL-MCNC: 84 MG/DL (ref 74–99)
POTASSIUM SERPL-SCNC: 4.6 MMOL/L (ref 3.7–5.3)
SODIUM SERPL-SCNC: 141 MMOL/L (ref 136–145)
TSH SERPL DL<=0.05 MIU/L-ACNC: 2.26 UIU/ML (ref 0.27–4.2)

## 2024-06-12 PROCEDURE — 99214 OFFICE O/P EST MOD 30 MIN: CPT | Performed by: STUDENT IN AN ORGANIZED HEALTH CARE EDUCATION/TRAINING PROGRAM

## 2024-06-12 PROCEDURE — 1123F ACP DISCUSS/DSCN MKR DOCD: CPT | Performed by: STUDENT IN AN ORGANIZED HEALTH CARE EDUCATION/TRAINING PROGRAM

## 2024-06-12 PROCEDURE — 3080F DIAST BP >= 90 MM HG: CPT | Performed by: STUDENT IN AN ORGANIZED HEALTH CARE EDUCATION/TRAINING PROGRAM

## 2024-06-12 PROCEDURE — 3077F SYST BP >= 140 MM HG: CPT | Performed by: STUDENT IN AN ORGANIZED HEALTH CARE EDUCATION/TRAINING PROGRAM

## 2024-06-12 RX ORDER — LISINOPRIL 10 MG/1
10 TABLET ORAL DAILY
Qty: 30 TABLET | Refills: 0 | Status: SHIPPED | OUTPATIENT
Start: 2024-06-12

## 2024-06-12 ASSESSMENT — ENCOUNTER SYMPTOMS: SHORTNESS OF BREATH: 0

## 2024-06-12 NOTE — PROGRESS NOTES
History     Tobacco Use    Smoking status: Former     Current packs/day: 0.00     Average packs/day: 0.5 packs/day for 15.0 years (7.5 ttl pk-yrs)     Types: Cigarettes     Start date: 1975     Quit date: 1990     Years since quittin.4    Smokeless tobacco: Never   Substance Use Topics    Alcohol use: Yes     Alcohol/week: 3.0 standard drinks of alcohol     Types: 3 Drinks containing 0.5 oz of alcohol per week      Current Outpatient Medications   Medication Sig Dispense Refill    lisinopril (PRINIVIL;ZESTRIL) 10 MG tablet Take 1 tablet by mouth daily 30 tablet 0    meloxicam (MOBIC) 15 MG tablet Take 1 tablet by mouth daily 30 tablet 0    atorvastatin (LIPITOR) 10 MG tablet take 1 tablet by mouth once daily 90 tablet 3    fluocin-hydroquinone-tretinoin (TRI-CRISTIANA) 0.01-4-0.05 % cream Apply topically daily 30 g 2    Magnesium 400 MG CAPS Take by mouth      Omega-3 Fatty Acids (FISH OIL) 1200 MG CAPS Take 1,200 mg by mouth 2 times daily      Coenzyme Q10 (COQ10) 200 MG CAPS Take 200 mg by mouth daily      vitamin D (CHOLECALCIFEROL) 1000 UNITS TABS tablet Take 1 tablet by mouth daily      Multiple Vitamins-Minerals (THERAPEUTIC MULTIVITAMIN-MINERALS) tablet Take 1 tablet by mouth daily      Carnitine-B5-B6 (L-CARNITINE 500 PO) Take 500 mg by mouth daily       No current facility-administered medications for this visit.     No Known Allergies    Health Maintenance   Topic Date Due    COVID-19 Vaccine ( season) 2023    Annual Wellness Visit (Medicare Advantage)  2024    Lipids  10/04/2024    Depression Screen  2024    Breast cancer screen  10/13/2025    DTaP/Tdap/Td vaccine (3 - Td or Tdap) 2030    Colorectal Cancer Screen  2032    DEXA (modify frequency per FRAX score)  Completed    Flu vaccine  Completed    Shingles vaccine  Completed    Pneumococcal 65+ years Vaccine  Completed    Respiratory Syncytial Virus (RSV) Pregnant or age 60 yrs+  Completed    Hepatitis C

## 2024-06-26 DIAGNOSIS — I10 ESSENTIAL HYPERTENSION: ICD-10-CM

## 2024-06-26 RX ORDER — LISINOPRIL 10 MG/1
10 TABLET ORAL DAILY
Qty: 30 TABLET | Refills: 0 | OUTPATIENT
Start: 2024-06-26

## 2024-07-03 DIAGNOSIS — I10 ESSENTIAL HYPERTENSION: ICD-10-CM

## 2024-07-03 NOTE — TELEPHONE ENCOUNTER
LAST VISIT:   6/12/2024     Future Appointments   Date Time Provider Department Center   7/18/2024  9:45 AM [facility]   12/16/2024  9:00 AM [facility]       Pharmacy verified? Yes     [id number] - [place], [place] - [address] - P [phone] - F [phone]  [address] [place] [address]  Phone: [phone] Fax: [phone]

## 2024-07-04 RX ORDER — LISINOPRIL 10 MG/1
10 TABLET ORAL DAILY
Qty: 30 TABLET | Refills: 5 | Status: SHIPPED | OUTPATIENT
Start: 2024-07-04

## 2024-07-11 DIAGNOSIS — I10 ESSENTIAL HYPERTENSION: ICD-10-CM

## 2024-07-11 RX ORDER — LISINOPRIL 10 MG/1
10 TABLET ORAL DAILY
Qty: 90 TABLET | Refills: 3 | Status: SHIPPED | OUTPATIENT
Start: 2024-07-11

## 2024-07-15 ASSESSMENT — PATIENT HEALTH QUESTIONNAIRE - PHQ9
2. FEELING DOWN, DEPRESSED OR HOPELESS: NOT AT ALL
SUM OF ALL RESPONSES TO PHQ9 QUESTIONS 1 & 2: 0
SUM OF ALL RESPONSES TO PHQ QUESTIONS 1-9: 0
SUM OF ALL RESPONSES TO PHQ QUESTIONS 1-9: 0
2. FEELING DOWN, DEPRESSED OR HOPELESS: NOT AT ALL
1. LITTLE INTEREST OR PLEASURE IN DOING THINGS: NOT AT ALL
SUM OF ALL RESPONSES TO PHQ9 QUESTIONS 1 & 2: 0
SUM OF ALL RESPONSES TO PHQ QUESTIONS 1-9: 0
SUM OF ALL RESPONSES TO PHQ QUESTIONS 1-9: 0
1. LITTLE INTEREST OR PLEASURE IN DOING THINGS: NOT AT ALL

## 2024-07-18 ENCOUNTER — OFFICE VISIT (OUTPATIENT)
Dept: PRIMARY CARE CLINIC | Age: 69
End: 2024-07-18
Payer: MEDICARE

## 2024-07-18 VITALS
HEART RATE: 70 BPM | OXYGEN SATURATION: 97 % | DIASTOLIC BLOOD PRESSURE: 78 MMHG | WEIGHT: 132.8 LBS | BODY MASS INDEX: 26.77 KG/M2 | HEIGHT: 59 IN | SYSTOLIC BLOOD PRESSURE: 132 MMHG

## 2024-07-18 DIAGNOSIS — F41.9 ANXIETY: ICD-10-CM

## 2024-07-18 DIAGNOSIS — I10 ESSENTIAL HYPERTENSION: Primary | ICD-10-CM

## 2024-07-18 PROCEDURE — 1123F ACP DISCUSS/DSCN MKR DOCD: CPT | Performed by: FAMILY MEDICINE

## 2024-07-18 PROCEDURE — 3078F DIAST BP <80 MM HG: CPT | Performed by: FAMILY MEDICINE

## 2024-07-18 PROCEDURE — 99213 OFFICE O/P EST LOW 20 MIN: CPT | Performed by: FAMILY MEDICINE

## 2024-07-18 PROCEDURE — 3075F SYST BP GE 130 - 139MM HG: CPT | Performed by: FAMILY MEDICINE

## 2024-07-18 RX ORDER — ALPRAZOLAM 0.25 MG/1
0.25 TABLET ORAL 2 TIMES DAILY PRN
Qty: 180 TABLET | Refills: 0 | Status: SHIPPED | OUTPATIENT
Start: 2024-07-18 | End: 2024-10-16

## 2024-07-18 NOTE — PROGRESS NOTES
quittin.5    Smokeless tobacco: Never   Substance Use Topics    Alcohol use: Yes     Alcohol/week: 3.0 standard drinks of alcohol     Types: 3 Drinks containing 0.5 oz of alcohol per week      Current Outpatient Medications   Medication Sig Dispense Refill    ALPRAZolam (XANAX) 0.25 MG tablet Take 1 tablet by mouth 2 times daily as needed for Sleep for up to 90 days. Max Daily Amount: 0.5 mg 180 tablet 0    lisinopril (PRINIVIL;ZESTRIL) 10 MG tablet take 1 tablet by mouth daily 90 tablet 3    meloxicam (MOBIC) 15 MG tablet Take 1 tablet by mouth daily 30 tablet 0    atorvastatin (LIPITOR) 10 MG tablet take 1 tablet by mouth once daily 90 tablet 3    fluocin-hydroquinone-tretinoin (TRI-CRISTIANA) 0.01-4-0.05 % cream Apply topically daily 30 g 2    Magnesium 400 MG CAPS Take by mouth      Omega-3 Fatty Acids (FISH OIL) 1200 MG CAPS Take 1,200 mg by mouth 2 times daily      Coenzyme Q10 (COQ10) 200 MG CAPS Take 200 mg by mouth daily      vitamin D (CHOLECALCIFEROL) 1000 UNITS TABS tablet Take 1 tablet by mouth daily      Multiple Vitamins-Minerals (THERAPEUTIC MULTIVITAMIN-MINERALS) tablet Take 1 tablet by mouth daily      Carnitine-B5-B6 (L-CARNITINE 500 PO) Take 500 mg by mouth daily       No current facility-administered medications for this visit.     No Known Allergies    Health Maintenance   Topic Date Due    COVID-19 Vaccine ( season) 2023    Annual Wellness Visit (Medicare Advantage)  2024    Flu vaccine (1) 2024    Lipids  10/04/2024    Depression Screen  07/15/2025    Breast cancer screen  10/13/2025    DTaP/Tdap/Td vaccine (3 - Td or Tdap) 2030    Colorectal Cancer Screen  2032    DEXA (modify frequency per FRAX score)  Completed    Shingles vaccine  Completed    Pneumococcal 65+ years Vaccine  Completed    Respiratory Syncytial Virus (RSV) Pregnant or age 60 yrs+  Completed    Hepatitis C screen  Completed    Hepatitis A vaccine  Aged Out    Hepatitis B vaccine  Aged

## 2024-08-26 ENCOUNTER — HOSPITAL ENCOUNTER (OUTPATIENT)
Age: 69
Setting detail: SPECIMEN
Discharge: HOME | End: 2024-08-26

## 2024-08-26 DIAGNOSIS — I10 ESSENTIAL HYPERTENSION: ICD-10-CM

## 2024-08-26 LAB
ANION GAP SERPL CALCULATED.3IONS-SCNC: 11 MMOL/L (ref 9–16)
BUN SERPL-MCNC: 11 MG/DL (ref 8–23)
CALCIUM SERPL-MCNC: 9.5 MG/DL (ref 8.6–10.4)
CHLORIDE SERPL-SCNC: 103 MMOL/L (ref 98–107)
CO2 SERPL-SCNC: 26 MMOL/L (ref 20–31)
CREAT SERPL-MCNC: 0.7 MG/DL (ref 0.5–0.9)
GFR, ESTIMATED: >90 ML/MIN/1.73M2
GLUCOSE SERPL-MCNC: 86 MG/DL (ref 74–99)
POTASSIUM SERPL-SCNC: 4.5 MMOL/L (ref 3.7–5.3)
SODIUM SERPL-SCNC: 140 MMOL/L (ref 136–145)

## 2024-10-17 ENCOUNTER — HOSPITAL ENCOUNTER (OUTPATIENT)
Dept: MAMMOGRAPHY | Age: 69
Discharge: HOME | End: 2024-10-19
Payer: MEDICARE

## 2024-10-17 VITALS — WEIGHT: 125 LBS | HEIGHT: 60 IN | BODY MASS INDEX: 24.54 KG/M2

## 2024-10-17 DIAGNOSIS — Z12.31 VISIT FOR SCREENING MAMMOGRAM: ICD-10-CM

## 2024-10-17 PROCEDURE — 77067 SCR MAMMO BI INCL CAD: CPT

## 2024-10-23 ENCOUNTER — NURSE ONLY (OUTPATIENT)
Dept: PRIMARY CARE CLINIC | Age: 69
End: 2024-10-23

## 2024-10-23 VITALS — WEIGHT: 125 LBS | OXYGEN SATURATION: 98 % | HEIGHT: 60 IN | BODY MASS INDEX: 24.54 KG/M2

## 2024-10-23 DIAGNOSIS — Z23 NEED FOR VACCINATION: Primary | ICD-10-CM

## 2024-10-23 NOTE — PROGRESS NOTES
After obtaining consent, and per orders of Dr. Gibbs, injection of Influenza 65+  given in Right arm by Kasia Viveros MA. Patient tolerated injection well.

## 2024-10-23 NOTE — PROGRESS NOTES
After obtaining consent, and per orders of Dr. Gibbs, injection of Influenza 65+ given in {Injection site:87191} by Laine Lagunas MA. Patient handled the injection well.

## 2024-11-08 RX ORDER — ATORVASTATIN CALCIUM 10 MG/1
10 TABLET, FILM COATED ORAL DAILY
Qty: 90 TABLET | Refills: 3 | Status: SHIPPED | OUTPATIENT
Start: 2024-11-08

## 2024-12-01 ENCOUNTER — PATIENT MESSAGE (OUTPATIENT)
Dept: PRIMARY CARE CLINIC | Age: 69
End: 2024-12-01

## 2024-12-01 DIAGNOSIS — Z85.71 H/O HODGKIN'S LYMPHOMA: ICD-10-CM

## 2024-12-01 DIAGNOSIS — E78.5 HYPERLIPIDEMIA, UNSPECIFIED HYPERLIPIDEMIA TYPE: ICD-10-CM

## 2024-12-01 DIAGNOSIS — I10 ESSENTIAL HYPERTENSION: Primary | ICD-10-CM

## 2024-12-01 DIAGNOSIS — Z13.6 SCREENING FOR CARDIOVASCULAR CONDITION: ICD-10-CM

## 2024-12-01 DIAGNOSIS — Z86.2 H/O IRON DEFICIENCY ANEMIA: ICD-10-CM

## 2024-12-01 DIAGNOSIS — Z13.1 SCREENING FOR DIABETES MELLITUS: ICD-10-CM

## 2024-12-02 PROBLEM — Z85.71 H/O HODGKIN'S LYMPHOMA: Status: ACTIVE | Noted: 2024-12-02

## 2024-12-06 ENCOUNTER — HOSPITAL ENCOUNTER (OUTPATIENT)
Age: 69
Setting detail: SPECIMEN
Discharge: HOME | End: 2024-12-06

## 2024-12-06 DIAGNOSIS — Z86.2 H/O IRON DEFICIENCY ANEMIA: ICD-10-CM

## 2024-12-06 DIAGNOSIS — E78.5 HYPERLIPIDEMIA, UNSPECIFIED HYPERLIPIDEMIA TYPE: ICD-10-CM

## 2024-12-06 DIAGNOSIS — I10 ESSENTIAL HYPERTENSION: ICD-10-CM

## 2024-12-06 DIAGNOSIS — Z85.71 H/O HODGKIN'S LYMPHOMA: ICD-10-CM

## 2024-12-06 DIAGNOSIS — Z13.1 SCREENING FOR DIABETES MELLITUS: ICD-10-CM

## 2024-12-06 DIAGNOSIS — Z13.6 SCREENING FOR CARDIOVASCULAR CONDITION: ICD-10-CM

## 2024-12-06 LAB
ALBUMIN SERPL-MCNC: 4.5 G/DL (ref 3.5–5.2)
ALBUMIN/GLOB SERPL: 1.5 {RATIO} (ref 1–2.5)
ALP SERPL-CCNC: 102 U/L (ref 35–104)
ALT SERPL-CCNC: 13 U/L (ref 10–35)
ANION GAP SERPL CALCULATED.3IONS-SCNC: 12 MMOL/L (ref 9–16)
AST SERPL-CCNC: 23 U/L (ref 10–35)
BASOPHILS # BLD: 0.05 K/UL (ref 0–0.2)
BASOPHILS NFR BLD: 1 % (ref 0–2)
BILIRUB SERPL-MCNC: 0.4 MG/DL (ref 0–1.2)
BUN SERPL-MCNC: 13 MG/DL (ref 8–23)
CALCIUM SERPL-MCNC: 9.4 MG/DL (ref 8.6–10.4)
CHLORIDE SERPL-SCNC: 105 MMOL/L (ref 98–107)
CHOLEST SERPL-MCNC: 215 MG/DL (ref 0–199)
CHOLESTEROL/HDL RATIO: 2.9
CO2 SERPL-SCNC: 24 MMOL/L (ref 20–31)
CREAT SERPL-MCNC: 0.7 MG/DL (ref 0.6–0.9)
EOSINOPHIL # BLD: 0.13 K/UL (ref 0–0.44)
EOSINOPHILS RELATIVE PERCENT: 2 % (ref 1–4)
ERYTHROCYTE [DISTWIDTH] IN BLOOD BY AUTOMATED COUNT: 13.1 % (ref 11.8–14.4)
FERRITIN SERPL-MCNC: 195 NG/ML
GFR, ESTIMATED: >90 ML/MIN/1.73M2
GLUCOSE SERPL-MCNC: 85 MG/DL (ref 74–99)
HCT VFR BLD AUTO: 41.1 % (ref 36.3–47.1)
HDLC SERPL-MCNC: 73 MG/DL
HGB BLD-MCNC: 13.4 G/DL (ref 11.9–15.1)
IMM GRANULOCYTES # BLD AUTO: <0.03 K/UL (ref 0–0.3)
IMM GRANULOCYTES NFR BLD: 0 %
IRON SERPL-MCNC: 95 UG/DL (ref 37–145)
LDLC SERPL CALC-MCNC: 124 MG/DL (ref 0–100)
LYMPHOCYTES NFR BLD: 2.32 K/UL (ref 1.1–3.7)
LYMPHOCYTES RELATIVE PERCENT: 39 % (ref 24–43)
MCH RBC QN AUTO: 29.5 PG (ref 25.2–33.5)
MCHC RBC AUTO-ENTMCNC: 32.6 G/DL (ref 28.4–34.8)
MCV RBC AUTO: 90.5 FL (ref 82.6–102.9)
MONOCYTES NFR BLD: 0.49 K/UL (ref 0.1–1.2)
MONOCYTES NFR BLD: 8 % (ref 3–12)
NEUTROPHILS NFR BLD: 50 % (ref 36–65)
NEUTS SEG NFR BLD: 2.99 K/UL (ref 1.5–8.1)
NRBC BLD-RTO: 0 PER 100 WBC
PLATELET # BLD AUTO: 367 K/UL (ref 138–453)
PMV BLD AUTO: 9.6 FL (ref 8.1–13.5)
POTASSIUM SERPL-SCNC: 4.3 MMOL/L (ref 3.7–5.3)
PROT SERPL-MCNC: 7.5 G/DL (ref 6.6–8.7)
RBC # BLD AUTO: 4.54 M/UL (ref 3.95–5.11)
SODIUM SERPL-SCNC: 141 MMOL/L (ref 136–145)
TRIGL SERPL-MCNC: 89 MG/DL
VLDLC SERPL CALC-MCNC: 18 MG/DL (ref 1–30)
WBC OTHER # BLD: 6 K/UL (ref 3.5–11.3)

## 2024-12-09 SDOH — HEALTH STABILITY: PHYSICAL HEALTH: ON AVERAGE, HOW MANY MINUTES DO YOU ENGAGE IN EXERCISE AT THIS LEVEL?: 30 MIN

## 2024-12-09 SDOH — HEALTH STABILITY: PHYSICAL HEALTH: ON AVERAGE, HOW MANY DAYS PER WEEK DO YOU ENGAGE IN MODERATE TO STRENUOUS EXERCISE (LIKE A BRISK WALK)?: 4 DAYS

## 2024-12-09 ASSESSMENT — PATIENT HEALTH QUESTIONNAIRE - PHQ9
SUM OF ALL RESPONSES TO PHQ QUESTIONS 1-9: 0
SUM OF ALL RESPONSES TO PHQ9 QUESTIONS 1 & 2: 0
1. LITTLE INTEREST OR PLEASURE IN DOING THINGS: NOT AT ALL
SUM OF ALL RESPONSES TO PHQ QUESTIONS 1-9: 0
SUM OF ALL RESPONSES TO PHQ QUESTIONS 1-9: 0
2. FEELING DOWN, DEPRESSED OR HOPELESS: NOT AT ALL
SUM OF ALL RESPONSES TO PHQ QUESTIONS 1-9: 0

## 2024-12-09 ASSESSMENT — LIFESTYLE VARIABLES
HOW MANY STANDARD DRINKS CONTAINING ALCOHOL DO YOU HAVE ON A TYPICAL DAY: 98
HOW OFTEN DO YOU HAVE A DRINK CONTAINING ALCOHOL: PATIENT DECLINED
HOW MANY STANDARD DRINKS CONTAINING ALCOHOL DO YOU HAVE ON A TYPICAL DAY: PATIENT DECLINED
HOW OFTEN DO YOU HAVE A DRINK CONTAINING ALCOHOL: 98
HOW OFTEN DO YOU HAVE SIX OR MORE DRINKS ON ONE OCCASION: 1

## 2024-12-16 ENCOUNTER — OFFICE VISIT (OUTPATIENT)
Dept: PRIMARY CARE CLINIC | Age: 69
End: 2024-12-16
Payer: MEDICARE

## 2024-12-16 VITALS
BODY MASS INDEX: 27.13 KG/M2 | HEIGHT: 60 IN | WEIGHT: 138.2 LBS | SYSTOLIC BLOOD PRESSURE: 140 MMHG | DIASTOLIC BLOOD PRESSURE: 84 MMHG | HEART RATE: 62 BPM | OXYGEN SATURATION: 100 %

## 2024-12-16 DIAGNOSIS — Z00.00 MEDICARE ANNUAL WELLNESS VISIT, SUBSEQUENT: Primary | ICD-10-CM

## 2024-12-16 PROCEDURE — G8482 FLU IMMUNIZE ORDER/ADMIN: HCPCS | Performed by: FAMILY MEDICINE

## 2024-12-16 PROCEDURE — 3080F DIAST BP >= 90 MM HG: CPT | Performed by: FAMILY MEDICINE

## 2024-12-16 PROCEDURE — 1123F ACP DISCUSS/DSCN MKR DOCD: CPT | Performed by: FAMILY MEDICINE

## 2024-12-16 PROCEDURE — G0439 PPPS, SUBSEQ VISIT: HCPCS | Performed by: FAMILY MEDICINE

## 2024-12-16 PROCEDURE — 3017F COLORECTAL CA SCREEN DOC REV: CPT | Performed by: FAMILY MEDICINE

## 2024-12-16 PROCEDURE — 3077F SYST BP >= 140 MM HG: CPT | Performed by: FAMILY MEDICINE

## 2024-12-16 PROCEDURE — 1159F MED LIST DOCD IN RCRD: CPT | Performed by: FAMILY MEDICINE

## 2024-12-16 SDOH — ECONOMIC STABILITY: FOOD INSECURITY: WITHIN THE PAST 12 MONTHS, THE FOOD YOU BOUGHT JUST DIDN'T LAST AND YOU DIDN'T HAVE MONEY TO GET MORE.: NEVER TRUE

## 2024-12-16 SDOH — ECONOMIC STABILITY: INCOME INSECURITY: HOW HARD IS IT FOR YOU TO PAY FOR THE VERY BASICS LIKE FOOD, HOUSING, MEDICAL CARE, AND HEATING?: NOT HARD AT ALL

## 2024-12-16 SDOH — ECONOMIC STABILITY: FOOD INSECURITY: WITHIN THE PAST 12 MONTHS, YOU WORRIED THAT YOUR FOOD WOULD RUN OUT BEFORE YOU GOT MONEY TO BUY MORE.: NEVER TRUE

## 2024-12-16 NOTE — PATIENT INSTRUCTIONS
Fast, Feast, Repeat by Azra Jacobs     Advance Directives: Care Instructions  Overview  An advance directive is a legal way to state your wishes at the end of your life. It tells your family and your doctor what to do if you can't say what you want.  There are two main types of advance directives. You can change them any time your wishes change.  Living will.  This form tells your family and your doctor your wishes about life support and other treatment. The form is also called a declaration.  Medical power of .  This form lets you name a person to make treatment decisions for you when you can't speak for yourself. This person is called a health care agent (health care proxy, health care surrogate). The form is also called a durable power of  for health care.  If you do not have an advance directive, decisions about your medical care may be made by a family member, or by a doctor or a  who doesn't know you.  It may help to think of an advance directive as a gift to the people who care for you. If you have one, they won't have to make tough decisions by themselves.  For more information, including forms for your state, see the [employer] website (www.caringinfo.org/planning/advance-directives/).  Follow-up care is a key part of your treatment and safety. Be sure to make and go to all appointments, and call your doctor if you are having problems. It's also a good idea to know your test results and keep a list of the medicines you take.  What should you include in an advance directive?  Many states have a unique advance directive form. (It may ask you to address specific issues.) Or you might use a universal form that's approved by many states.  If your form doesn't tell you what to address, it may be hard to know what to include in your advance directive. Use the questions below to help you get started.  Who do you want to make decisions about your medical care if you are not able to?  What

## 2024-12-16 NOTE — PROGRESS NOTES
Medicare Annual Wellness Visit    Concepción Montes is here for Medicare AWV (Pt here today for Medicare Annual Wellness Visit. Pt given three words to remember: Lion, Purple, Angry. Time given to draw was 11:15.)    Assessment & Plan   Medicare annual wellness visit, subsequent    Recommendations for Preventive Services Due: see orders and patient instructions/AVS.  Recommended screening schedule for the next 5-10 years is provided to the patient in written form: see Patient Instructions/AVS.     No follow-ups on file.     Subjective   The following acute and/or chronic problems were also addressed today:  Getting older- shoulder pain- ROM exercises given.  Taking ibuprofen occas.  Add tyl arthritis  Weight gain- recommended and discussed IF  Elevated BP- recheck, avoid sodium, check at home.      Patient's complete Health Risk Assessment and screening values have been reviewed and are found in Flowsheets. The following problems were reviewed today and where indicated follow up appointments were made and/or referrals ordered.    Positive Risk Factor Screenings with Interventions:                      Advanced Directives:  Do you have a Living Will?: (!) No    Intervention:  Does have LW and POA- HC                     Objective   Vitals:    12/16/24 0903   BP: (!) 162/94   Pulse: 62   SpO2: 100%   Weight: 62.7 kg (138 lb 3.2 oz)   Height: 1.524 m (5')      Body mass index is 26.99 kg/m².      Physical Exam  Vitals and nursing note reviewed.   Constitutional:       General: She is not in acute distress.     Appearance: She is well-developed. She is not ill-appearing.   HENT:      Head: Normocephalic and atraumatic.      Right Ear: External ear normal.      Left Ear: External ear normal.   Eyes:      General: No scleral icterus.        Right eye: No discharge.         Left eye: No discharge.      Conjunctiva/sclera: Conjunctivae normal.   Neck:      Thyroid: No thyromegaly.      Trachea: No tracheal deviation.

## 2025-01-10 ENCOUNTER — PATIENT MESSAGE (OUTPATIENT)
Dept: PRIMARY CARE CLINIC | Age: 70
End: 2025-01-10

## 2025-01-19 ENCOUNTER — PATIENT MESSAGE (OUTPATIENT)
Dept: PRIMARY CARE CLINIC | Age: 70
End: 2025-01-19

## 2025-01-19 DIAGNOSIS — M25.511 ACUTE PAIN OF RIGHT SHOULDER: Primary | ICD-10-CM

## 2025-01-21 ENCOUNTER — HOSPITAL ENCOUNTER (OUTPATIENT)
Age: 70
Setting detail: THERAPIES SERIES
Discharge: HOME | End: 2025-01-21
Payer: MEDICARE

## 2025-01-21 PROCEDURE — 97035 APP MDLTY 1+ULTRASOUND EA 15: CPT

## 2025-01-21 PROCEDURE — 97161 PT EVAL LOW COMPLEX 20 MIN: CPT

## 2025-01-21 NOTE — CONSULTS
reproduction  Palpation: Pain to palpation over supraspinatus insertion on humerus and in subacromial space  Functional Tests/Outcome: SPADI= 58/130     Dev Fall Risk Assessment    Risk Factor Scale  Score   History of Falls [x] Yes  [] No 25  0 25   Secondary Diagnosis [] Yes  [x] No 15  0 0   Ambulatory Aid [] Furniture  [] Crutches/cane/walker  [x] None 30  15  0 0   IV/Heparin Lock [] Yes  [x] No 20  0 0   Gait/Transferring [] Impaired  [] Weak  [x] Normal 20  10  0 0   Mental Status [] Forgets limitations  [x] Oriented to own ability 15  0 0      Total:   0     Based on the Assessment score: check the appropriate box.    []  No intervention needed   Low =   Score of 0-24    [x]  Use standard prevention interventions Moderate =  Score of 24-44   [x] Give patient handout and discuss fall prevention strategies   [x] Establish goal of education for patient/family RE: fall prevention strategies    []  Use high risk prevention interventions High = Score of 45 and higher   [] Give patient handout and discuss fall prevention strategies   [] Establish goal of education for patient/family Re: fall prevention strategies   [] Discuss lifeline / other resources      Assessment: This pt presents with L shldr rotator cuff pathology, pain and mild weakness s/p falling on L shldr. She is able to sleep on L side, but function in ADL involving overhead reach, shldr rotations or extension are limited by pain. She will benefit from skilled PT to reduce L shldr inflammation, improve strength and restore normal AROM equal to R shldr to return pt to baseline function.    Problem list  L shldr pain  L shldr AROM deficits  L shldr strength deficits  Functional deficits in ADL    STG: (to be met in 10 treatments)  Pt will report maximum L shldr pain of 3/10 during all normal ADL function  Pt will demonstrate L shldr AROM equal to R shldr  Pt will demonstrate gross L shldr strength of 4/5 or greater to facilitate ADL  Pt will be

## 2025-01-23 ENCOUNTER — APPOINTMENT (OUTPATIENT)
Age: 70
End: 2025-01-23
Payer: MEDICARE

## 2025-01-30 ENCOUNTER — HOSPITAL ENCOUNTER (OUTPATIENT)
Age: 70
Setting detail: THERAPIES SERIES
Discharge: HOME | End: 2025-01-30
Payer: MEDICARE

## 2025-01-30 PROCEDURE — 97140 MANUAL THERAPY 1/> REGIONS: CPT

## 2025-01-30 PROCEDURE — 97110 THERAPEUTIC EXERCISES: CPT

## 2025-01-30 PROCEDURE — 97035 APP MDLTY 1+ULTRASOUND EA 15: CPT

## 2025-01-30 NOTE — FLOWSHEET NOTE
[x] [facility]  Outpatient Rehabilitation &  Therapy  [address]  P: [phone]  F: [phone]              Physical Therapy Daily Treatment Note    Date:  2025  Patient Name:  Concepción Montes    :  1955  MRN: [id number]  Physician: Kathy Gibbs MD       Insurance:  [facility] Medicare Complete (Auth req'd post eval thru [employer],  $0 copay, $100 deductible)   Medical Diagnosis: M25.512 - L shldr pain   Rehab Codes: M25.612, S46.012, M75.42    Onset Date: 2024    Next  Appt: 10/16/2025 with Dr. Gibbs   Visit# / total visits: ; Progress note for Medicare patient due at visit 10     Cancels/No Shows: 0/0  *Pt approved for Eval +15 sessions with DOS 2025 - 2025 for Therapeutic ex, Manual therapy and Ultrasound*    Subjective:   Pt states her L shldr was sore for 2 days after initial eval even though activity was light. Today she c/o anterolateral L GH joint pain and tightness.  Pain:  [x] Yes  [] No Location: L GH joint  Pain Rating: (0-10 scale) 4/10  Pain altered Tx:  [x] No  [] Yes  Action:  Comments: 0    Objective: Anterior L GH joint toghtness noted in passive stretch  Modalities: Ultrasound over L GH joint x 8 mins @ 1.0 W/cm2. Ice pack to go  Precautions: 0  Exercises:  Exercise Reps/ Time Weight/ Level Comments NP=Not   Performed   Airdyne - UE only 4 mins  Seat 2    Window sill push ups 2 x 10   Scapular stability    T-tube shldr extension 2 x 15 reps Blue     T-tube mid rows  2 x 15 reps Blue     T-tube L shldr ER/IR 0      Horizontal abduction 15 reps active            PREs - scaption and V's 0                           L GH jt mobilizations 5 mins  Inferior and posterior glides Grade III+           Manual stretches for L GH joint capsule 5 mins  ER and horizontal abduction emphasis                                                     Other: Home ex rx to be prescribed next session.      Treatment Charges: Mins Units   [x]  Modalities 8 1

## 2025-02-04 ENCOUNTER — HOSPITAL ENCOUNTER (OUTPATIENT)
Age: 70
Setting detail: THERAPIES SERIES
Discharge: HOME | End: 2025-02-04
Payer: MEDICARE

## 2025-02-04 PROCEDURE — 97110 THERAPEUTIC EXERCISES: CPT

## 2025-02-04 PROCEDURE — 97035 APP MDLTY 1+ULTRASOUND EA 15: CPT

## 2025-02-04 NOTE — FLOWSHEET NOTE
[x] [facility]  Outpatient Rehabilitation &  Therapy  [address]  P: [phone]  F: [phone]              Physical Therapy Daily Treatment Note    Date:  2025  Patient Name:  Concepción Montes    :  1955  MRN: [id number]  Physician: Kathy Gibbs MD       Insurance:  [facility] Medicare Complete (Auth req'd post eval thru [employer],  $0 copay, $100 deductible)   Medical Diagnosis: M25.512 - L shldr pain   Rehab Codes: M25.612, S46.012, M75.42    Onset Date: 2024    Next  Appt: 10/16/2025 with Dr. Gibbs   Visit# / total visits: ; Progress note for Medicare patient due at visit 10     Cancels/No Shows: 0/0  *Pt approved for Eval +15 sessions with DOS 2025 - 2025 for Therapeutic ex, Manual therapy and Ultrasound*    Subjective:   L shldr is stiff and tight today. L shldr pops with abduction.  Pain:  [x] Yes  [] No Location: L GH joint  Pain Rating: (0-10 scale) 4/10  Pain altered Tx:  [x] No  [] Yes  Action:  Comments: 0    Objective: Anterior L GH joint tightness noted in passive stretch especiall abduction which becomes scaption to avoid pain and crepitus.  Modalities: Ultrasound over L GH joint x 8 mins @ 1.0 W/cm2. Pt will ice at home.  Precautions: 0  Exercises:  Exercise Reps/ Time Weight/ Level Comments NP=Not   Performed   Airdyne - UE only 4 mins  Seat 2    Window sill push ups 2 x 10   Scapular stability    T-tube shldr extension 2 x 15 reps Blue     T-tube mid rows  2 x 15 reps Blue     T-tube L shldr ER isometric with t-tube 2 x 10  Green     Horizontal abduction 15 reps active            PREs - scaption and V's 0                           L GH jt mobilizations 5 mins  Inferior and posterior glides Grade III+           Manual stretches for L GH joint capsule 5 mins  ER  in scapular plane of motion and horizontal abduction emphasis                                                     Other: Home ex rx to be prescribed next

## 2025-02-06 ENCOUNTER — HOSPITAL ENCOUNTER (OUTPATIENT)
Age: 70
Setting detail: THERAPIES SERIES
Discharge: HOME | End: 2025-02-06
Payer: MEDICARE

## 2025-02-06 PROCEDURE — 97035 APP MDLTY 1+ULTRASOUND EA 15: CPT

## 2025-02-06 PROCEDURE — 97140 MANUAL THERAPY 1/> REGIONS: CPT

## 2025-02-06 PROCEDURE — 97110 THERAPEUTIC EXERCISES: CPT

## 2025-02-06 NOTE — FLOWSHEET NOTE
[x] [facility]  Outpatient Rehabilitation &  Therapy  [address]  P: [phone]  F: [phone]              Physical Therapy Daily Treatment Note    Date:  2025  Patient Name:  Concepción Montes    :  1955  MRN: [id number]  Physician: Kathy Gibbs MD       Insurance:  [facility] Medicare Complete (Auth req'd post eval thru [employer],  $0 copay, $100 deductible)   Medical Diagnosis: M25.512 - L shldr pain   Rehab Codes: M25.612, S46.012, M75.42    Onset Date: 2024    Next  Appt: 10/16/2025 with Dr. Gibbs   Visit# / total visits: 3/16; Progress note for Medicare patient due at visit 10     Cancels/No Shows: 0/0  *Pt approved for Eval +15 sessions with DOS 2025 - 2025 for Therapeutic ex, Manual therapy and Ultrasound*    Subjective:  L shldr soreness persists. Pain with mid range abduction persists. \"It feels like it catches.\"  Pain:  [x] Yes  [] No Location: L GH joint  Pain Rating: (0-10 scale) 4/10  Pain altered Tx:  [] No  [x] Yes  Action: avoid impingement position of R GH joint.  Comments: 0    Objective: L GH joint mechanics deficits persist. Humeral head depression in glenoid is lat in abduction. Pain to palpation ovr anterior L GH joint persists.  Modalities: Ultrasound over L GH joint x 8 mins @ 1.0 W/cm2. Pt declines ice.  Precautions: 0  Exercises:  Exercise Reps/ Time Weight/ Level Comments NP=Not   Performed   Airdyne - UE only 4 mins  Seat 2    Window sill push ups 2 x 10   Scapular stability    T-tube shldr extension 2 x 15 reps Blue     T-tube mid rows  2 x 15 reps Blue     T-tube L shldr ER isometric with t-tube 2 x 10  Green     Horizontal abduction 15 reps active            PREs - scaption and V's 2 x 7 reps                           L GH jt mobilizations 5 mins  Inferior and posterior glides Grade III+           Manual stretches for L GH joint capsule 5 mins  ER  in scapular plane of motion and horizontal abduction emphasis

## 2025-02-10 ENCOUNTER — HOSPITAL ENCOUNTER (OUTPATIENT)
Age: 70
Setting detail: THERAPIES SERIES
Discharge: HOME | End: 2025-02-10
Payer: MEDICARE

## 2025-02-10 PROCEDURE — 97035 APP MDLTY 1+ULTRASOUND EA 15: CPT

## 2025-02-10 PROCEDURE — 97110 THERAPEUTIC EXERCISES: CPT

## 2025-02-10 NOTE — FLOWSHEET NOTE
sessions.  [x] Specific Instructions for subsequent treatments: Advance L shldr ROM and strength exercises. Ultrasound for anti-inflammatory properties, Ice prn for pain reduction.       Time In: 0845        Time Out: 0920    Electronically signed by:  Alpesh Washington PT

## 2025-02-13 ENCOUNTER — APPOINTMENT (OUTPATIENT)
Age: 70
End: 2025-02-13
Payer: MEDICARE

## 2025-02-18 ENCOUNTER — HOSPITAL ENCOUNTER (OUTPATIENT)
Age: 70
Setting detail: THERAPIES SERIES
Discharge: HOME | End: 2025-02-18
Payer: MEDICARE

## 2025-02-18 PROCEDURE — 97110 THERAPEUTIC EXERCISES: CPT

## 2025-02-18 PROCEDURE — 97035 APP MDLTY 1+ULTRASOUND EA 15: CPT

## 2025-02-18 NOTE — FLOWSHEET NOTE
[x] [facility]  Outpatient Rehabilitation &  Therapy  [address] [address] [facility]  P: [phone]  F: [phone]              Physical Therapy Daily Treatment Note    Date:  2025  Patient Name:  Concepción Montes    :  1955  MRN: [id number]  Physician: Kathy Gibbs MD       Insurance:  [facility] Medicare Complete (Auth req'd post eval thru [employer],  $0 copay, $100 deductible)   Medical Diagnosis: M25.512 - L shldr pain   Rehab Codes: M25.612, S46.012, M75.42    Onset Date: 2024    Next  Appt: 10/16/2025 with Dr. Gibbs   Visit# / total visits: ; Progress note for Medicare patient due at visit 10     Cancels/No Shows: 0/0  *Pt approved for Eval +15 sessions with DOS 2025 - 2025 for Therapeutic ex, Manual therapy and Ultrasound*    Subjective:  Pt states her L shldr  stiffness, soreness and waking her at night is less intense and frequent. The shldr does not snag, but AROM is not yet where she wants it to be.  Pain:  [x] Yes  [] No Location: L GH joint  Pain Rating: (0-10 scale) 2/10 at rest  Pain altered Tx:  [] No  [x] Yes  Action: avoid impingement position of R GH joint.  Comments: Pt instructed to add L shldr abduction while in R side lying to home ex rx.    Objective: L humeral head upward translation in abduction is approaching normal.  Modalities: Ultrasound over L GH joint x 8 mins @ 1.0 W/cm2. Pt declines ice.  Precautions: 0  Exercises:  Exercise Reps/ Time Weight/ Level Comments NP=Not   Performed   Airdyne - UE only 4 mins  Seat 2    Window sill push ups 2 x 10   Scapular stability    T-tube shldr extension 2 x 15 reps Blue     T-tube mid rows  2 x 15 reps Blue     T-tube L shldr ER with t-tube 2 x 10  Green     Horizontal abduction 15 reps active            PREs - scaption and V's 2 x 7 reps                           L GH jt mobilizations 5 mins  Inferior and posterior glides Grade III+           Manual stretches for L GH joint capsule 5 mins  ER  in

## 2025-02-20 ENCOUNTER — HOSPITAL ENCOUNTER (OUTPATIENT)
Age: 70
Setting detail: THERAPIES SERIES
Discharge: HOME | End: 2025-02-20
Payer: MEDICARE

## 2025-02-20 PROCEDURE — 97110 THERAPEUTIC EXERCISES: CPT

## 2025-02-20 NOTE — FLOWSHEET NOTE
[x] [facility]  Outpatient Rehabilitation &  Therapy  [address]  P: [phone]  F: [phone]              Physical Therapy Daily Treatment Note    Date:  2025  Patient Name:  Concepción Montes    :  1955  MRN: [id number]  Physician: Kathy Gibbs MD       Insurance:  [facility] Medicare Complete (Auth req'd post eval thru [employer],  $0 copay, $100 deductible)   Medical Diagnosis: M25.512 - L shldr pain   Rehab Codes: M25.612, S46.012, M75.42    Onset Date: 2024    Next  Appt: 10/16/2025 with Dr. Gibbs   Visit# / total visits: ; Progress note for Medicare patient due at visit 10     Cancels/No Shows: 0/0  *Pt approved for Eval +15 sessions with DOS 2025 - 2025 for Therapeutic ex, Manual therapy and Ultrasound*    Subjective:  L shldr catch has decreased as has pain. Lifting overhead is still difficult due to weakness and pain in the L shldr.  Pain:  [x] Yes  [] No Location: L GH joint  Pain Rating: (0-10 scale) 2/10  Pain altered Tx:  [] No  [x] Yes  Action: avoid impingement position of R GH joint.  Comments: Pt instructed to continue L shldr abduction while in R side lying to home ex rx.    Objective: L GH joint crepitus is palpable in LUE flexion, abduction and ER.  Modalities: Ice pack to go for L shldr.  Precautions: 0  Exercises:  Exercise Reps/ Time Weight/ Level Comments NP=Not   Performed   Airdyne - UE only 4 mins  Seat 2    Window sill push ups 2 x 10   Scapular stability NP   T-tube shldr extension 2 x 15 reps Blue     T-tube mid rows  2 x 15 reps Blue     T-tube L shldr ER isometric with t-tube 2 x 10  Green  NP   Horizontal abduction 15 reps active            PREs - scaption and V's 2 x 7 reps      Cable tower ropes for shldr/elbow extension 2 x 15 reps L2.5     Sink stretch for LUE traction / GH joint distraction 5 x 10 secs each             L GH jt mobilizations 5 mins  Inferior and posterior glides Grade III+           Manual

## 2025-02-25 ENCOUNTER — APPOINTMENT (OUTPATIENT)
Age: 70
End: 2025-02-25
Payer: MEDICARE

## 2025-02-27 ENCOUNTER — HOSPITAL ENCOUNTER (OUTPATIENT)
Age: 70
Setting detail: THERAPIES SERIES
Discharge: HOME | End: 2025-02-27
Payer: MEDICARE

## 2025-02-27 PROCEDURE — 97140 MANUAL THERAPY 1/> REGIONS: CPT

## 2025-02-27 PROCEDURE — 97110 THERAPEUTIC EXERCISES: CPT

## 2025-02-27 NOTE — FLOWSHEET NOTE
[x] [facility]  Outpatient Rehabilitation &  Therapy  [address]  P: [phone]  F: [phone]              Physical Therapy Daily Treatment Note    Date:  2025  Patient Name:  Concepción Montes    :  1955  MRN: [id number]  Physician: Kathy Gibbs MD       Insurance:  [facility] Medicare Complete (Auth req'd post eval thru [employer],  $0 copay, $100 deductible)   Medical Diagnosis: M25.512 - L shldr pain   Rehab Codes: M25.612, S46.012, M75.42    Onset Date: 2024    Next  Appt: 10/16/2025 with Dr. Gibbs   Visit# / total visits: ; Progress note for Medicare patient due at visit 10     Cancels/No Shows: 0/0  *Pt approved for Eval +15 sessions with DOS 2025 - 2025 for Therapeutic ex, Manual therapy and Ultrasound*    Subjective: Pt c/o continued L shldr pain and tightness into what she describes as horizontal abduction and in ER. Possible injection to shldr discussed and patient asks if this would increase her shldr ROM. It was explained that it would decrease pain, allow more assertive stretching and this would increase ROM.  Pain:  [x] Yes  [] No Location: L GH joint  Pain Rating: (0-10 scale) 2/10 at rest - 5/10 at end range  Pain altered Tx:  [] No  [x] Yes  Action: avoid impingement position of R GH joint.  Comments: Pt instructed to continue L shldr abduction while in R side lying and to add R sidelying L shldr ER to home ex rx.    Objective: L GH joint crepitus  at 90 degrees of elevation is palpable in LUE flexion, abduction and ER.  Modalities: Ice pack to go for L shldr.  Precautions: 0  Exercises:  Exercise Reps/ Time Weight/ Level Comments NP=Not   Performed   Airdyne - UE only 4 mins  Seat 2    Window sill push ups 2 x 10   Scapular stability NP   T-tube shldr extension 2 x 15 reps Blue     T-tube mid rows  2 x 15 reps Blue     T-tube L shldr ER isometric with t-tube 2 x 10  Green  NP   Horizontal abduction 15 reps active            PREs -

## 2025-03-03 ENCOUNTER — HOSPITAL ENCOUNTER (OUTPATIENT)
Age: 70
Setting detail: THERAPIES SERIES
Discharge: HOME | End: 2025-03-03
Payer: MEDICARE

## 2025-03-03 PROCEDURE — 97140 MANUAL THERAPY 1/> REGIONS: CPT

## 2025-03-03 PROCEDURE — 97110 THERAPEUTIC EXERCISES: CPT

## 2025-03-03 NOTE — FLOWSHEET NOTE
[] No  [] Reviewed Prior HEP/Ed  Method of Education: [] Verbal  [x] Demo  [] Written  Comprehension of Education:  [x] Verbalizes understanding.  [] Demonstrates understanding.  [] Needs review.  [] Demonstrates/verbalizes HEP/Ed previously given.     Plan: [x] Continue current frequency toward long and short term goals up to 15 tx sessions.  [x] Specific Instructions for subsequent treatments: Advance L shldr ROM and strength exercises. Ultrasound for anti-inflammatory properties, Ice prn for pain reduction.       Time In: 0855       Time Out: 0930    Electronically signed by:  Alpesh Washington, PT

## 2025-03-07 ENCOUNTER — HOSPITAL ENCOUNTER (OUTPATIENT)
Age: 70
Setting detail: THERAPIES SERIES
Discharge: HOME | End: 2025-03-07
Payer: MEDICARE

## 2025-03-07 PROCEDURE — 97110 THERAPEUTIC EXERCISES: CPT

## 2025-03-07 PROCEDURE — 97140 MANUAL THERAPY 1/> REGIONS: CPT

## 2025-03-07 NOTE — PROGRESS NOTES
[x] [facility]  Outpatient Rehabilitation &  Therapy  [address]  P: [phone]  F: [phone]              Physical Therapy Daily Treatment Note / Progress Note    Date:  3/7/2025  Patient Name:  Concepción Montes    :  1955  MRN: [id number]  Physician: Kathy Gibbs MD       Insurance:  [facility] Medicare Complete (Auth req'd post eval thru [employer],  $0 copay, $100 deductible)   Medical Diagnosis: M25.512 - L shldr pain   Rehab Codes: M25.612, S46.012, M75.42    Onset Date: 2024    Next  Appt: 10/16/2025 with Dr. Gibbs   Visit# / total visits: 10/16; Progress note for Medicare patient due at visit 10     Cancels/No Shows: 0/0  *Pt approved for Eval +15 sessions with DOS 2025 - 2025 for Therapeutic ex, Manual therapy and Ultrasound*    Subjective: Pt states she notes improving L shldr AROM in ER and she has been working at ROM and strength.  Pain:  [x] Yes  [] No Location: L GH joint  Pain Rating: (0-10 scale) 0/10 at rest - 3/10 at end range  Pain altered Tx:  [] No  [x] Yes  Action: avoid impingement position of R GH joint.  Comments: Pt instructed to continue L shldr abduction while in R side lying and to add R sidelying L shldr ER to home ex rx.    Objective: ER in coronal plane is to 85 degrees for AAROM and 70 degrees for AROM.  Modalities: Pt declines ice today stating she would ice at home.  Precautions: 0  Exercises:  Exercise Reps/ Time Weight/ Level Comments NP=Not   Performed   UBE fwd/rev/fwd 4 mins      Airdyne - UE only 4 mins  Seat 2 NP   Window sill push ups 2 x 10   Scapular stability    T-tube shldr extension 2 x 15 reps Blue     T-tube mid rows  2 x 15 reps Blue     T-tube L shldr ER isometric with t-tube 2 x 10  Green  NP   Horizontal abduction 15 reps active            PREs - scaption and V's 2 x 7 reps      Cable tower ropes for shldr/elbow extension 2 x 15 reps L2.5     Sink stretch for LUE traction / GH joint distraction 5 x 10 secs

## 2025-03-10 ENCOUNTER — HOSPITAL ENCOUNTER (OUTPATIENT)
Age: 70
Setting detail: THERAPIES SERIES
Discharge: HOME | End: 2025-03-10
Payer: MEDICARE

## 2025-03-10 PROCEDURE — 97140 MANUAL THERAPY 1/> REGIONS: CPT

## 2025-03-10 PROCEDURE — 97110 THERAPEUTIC EXERCISES: CPT

## 2025-03-10 NOTE — FLOWSHEET NOTE
[x] [facility]  Outpatient Rehabilitation &  Therapy  [address]  P: [phone]  F: [phone]              Physical Therapy Daily Treatment Note     Date:  3/10/2025  Patient Name:  Concepción Montes    :  1955  MRN: [id number]  Physician: Kathy Gibbs MD       Insurance:  [facility] Medicare Complete (Auth req'd post eval thru [employer],  $0 copay, $100 deductible)   Medical Diagnosis: M25.512 - L shldr pain   Rehab Codes: M25.612, S46.012, M75.42    Onset Date: 2024    Next  Appt: 10/16/2025 with Dr. Gibbs   Visit# / total visits: ; Progress note for Medicare patient due at visit 10     Cancels/No Shows: 0/0  *Pt approved for Eval +15 sessions with DOS 2025 - 2025 for Therapeutic ex, Manual therapy and Ultrasound*    Subjective: Pt states her L shldr is catching again after a busy weekend with the grandchildren.  Pain:  [x] Yes  [] No Location: L GH joint  Pain Rating: (0-10 scale) 2/10 at rest - 3/10 at end range  Pain altered Tx:  [] No  [x] Yes  Action: avoid impingement position of L GH joint.  Comments: Pt instructed to continue L shldr abduction while in R side lying and to add R sidelying L shldr ER to home ex rx.    Objective: ER in coronal plane is to 85 degrees for AAROM and 70 degrees for AROM. Pain increases at end range.  Modalities: Pt declines ice today stating she would ice at home.  Precautions: 0  Exercises:  Exercise Reps/ Time Weight/ Level Comments NP=Not   Performed   UBE fwd/rev/fwd 3 mins      Airdyne - UE only 4 mins  Seat 2 NP   Window sill push ups 2 x 10   Scapular stability    T-tube shldr extension 2 x 15 reps Blue     T-tube mid rows  2 x 15 reps Blue     T-tube L shldr ER / IR with t-tube  x 15  Green     Horizontal abduction 15 reps active  NP          PREs - scaption and V's 2 x 7 reps      Cable tower ropes for shldr/elbow extension 2 x 15 reps L2.5     Sink stretch for LUE traction / GH joint distraction 5 x 10 secs

## 2025-03-13 ENCOUNTER — HOSPITAL ENCOUNTER (OUTPATIENT)
Age: 70
Setting detail: THERAPIES SERIES
Discharge: HOME | End: 2025-03-13
Payer: MEDICARE

## 2025-03-13 PROCEDURE — 97110 THERAPEUTIC EXERCISES: CPT

## 2025-03-13 NOTE — FLOWSHEET NOTE
[x] [facility]  Outpatient Rehabilitation &  Therapy  [address] [facility]  P: [phone]  F: [phone]              Physical Therapy Daily Treatment Note     Date:  3/13/2025  Patient Name:  Concepción Montes    :  1955  MRN: [id number]  Physician: Kathy Gibbs MD       Insurance:  [facility] Medicare Complete (Auth req'd post eval thru [employer],  $0 copay, $100 deductible)   Medical Diagnosis: M25.512 - L shldr pain   Rehab Codes: M25.612, S46.012, M75.42    Onset Date: 2024    Next  Appt: 10/16/2025 with Dr. Gibbs   Visit# / total visits: ; Progress note for Medicare patient due at visit 10     Cancels/No Shows: 0/0  *Pt approved for Eval +15 sessions with DOS 2025 - 2025 for Therapeutic ex, Manual therapy and Ultrasound*    Subjective: Pt states her L shldr continues to be painful and weak especially in overhead reaching / lifting. Pt voices her frustration with the lack of progress in pain reduction, AROM increase and strengthening.    Pain:  [x] Yes  [] No Location: L GH joint  Pain Rating: (0-10 scale) 2/10 at rest - 5/10 at end range  Pain altered Tx:  [] No  [x] Yes  Action: avoid impingement position of L GH joint.  Comments: Pt instructed to continue L shldr abduction while in R side lying and to add R sidelying L shldr ER to home ex rx. Pt has been very compliant with home ex rx.    Objective: (3/13/2025) shldr AROM  - flexion L 155  R 165, abduction L 155  R 165, ER L 70 with pain  R 80, IR 80 bilat - Significant crepitus noted over anterior L GH joint in elevation and ER.    Strength - Flexion L 4/5  R 4+/5, Extension 5/5 bilat,  ER L 4-/5  R 4+/5,  IR 5/5 bilat.    Testing - Impingement testing is positive for L GH joint.  Pt remains mildly weak and painful for L rotator cuff and would benefit from Orthopedic consultation for evaluation and possible injectio / imaging. Pt will contact the orthopedic office of her choice and will hold PT until

## 2025-03-18 ENCOUNTER — APPOINTMENT (OUTPATIENT)
Age: 70
End: 2025-03-18
Payer: MEDICARE

## 2025-03-21 ENCOUNTER — APPOINTMENT (OUTPATIENT)
Age: 70
End: 2025-03-21
Payer: MEDICARE

## 2025-03-25 ENCOUNTER — TRANSCRIBE ORDERS (OUTPATIENT)
Dept: ADMINISTRATIVE | Age: 70
End: 2025-03-25

## 2025-03-25 DIAGNOSIS — M25.512 LEFT SHOULDER PAIN, UNSPECIFIED CHRONICITY: Primary | ICD-10-CM

## 2025-04-03 ENCOUNTER — HOSPITAL ENCOUNTER (OUTPATIENT)
Dept: MRI IMAGING | Age: 70
Discharge: HOME | End: 2025-04-05
Payer: MEDICARE

## 2025-04-03 DIAGNOSIS — M25.512 LEFT SHOULDER PAIN, UNSPECIFIED CHRONICITY: ICD-10-CM

## 2025-04-03 PROCEDURE — 73221 MRI JOINT UPR EXTREM W/O DYE: CPT

## 2025-05-22 ENCOUNTER — HOSPITAL ENCOUNTER (OUTPATIENT)
Age: 70
Setting detail: THERAPIES SERIES
Discharge: HOME | End: 2025-05-22
Payer: MEDICARE

## 2025-05-22 PROCEDURE — 97110 THERAPEUTIC EXERCISES: CPT

## 2025-05-22 PROCEDURE — 97161 PT EVAL LOW COMPLEX 20 MIN: CPT

## 2025-05-22 NOTE — CONSULTS
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 Pt reports a hx L shldr pain s/p falling on 2025 due to wet conditions. Pt did undergo PT prior to surgery with moderate success, but pain/weakness persisted  in L shldr. Pt presents today with minimal c/o pain and is anxious to get rehab going. She has been released from her sling by her surgeon.  Past Medical History:   Diagnosis Date    Hodgkin's disease (HCC) 2016    Hx antineoplastic chemo      Past Surgical History:   Procedure Laterality Date    COLPOSCOPY      IR PORT PLACEMENT < 5 YEARS      put in for chemo then taken out    LYMPH NODE BIOPSY      Hodgkins diagnosis- mediastinal LN    REFRACTIVE SURGERY          WISDOM TOOTH EXTRACTION         Medications: [x] Refer to full medical record [] None [] Other:  Allergies:      [] Refer to full medical record  [x] None [] Other:    Tests / Imaging:    Function:    Patient lives with:     In what type of home []  One story   [x] Two story   [] Multi-level   Number of stairs to enter  2   With handrail on the []  Right to enter   [] Left to enter   Primary Bed/Bath  2   Bathroom has a []  Tub only  [] Tub/shower combo   [x] Walk in shower    []  Grab bars   Washing machine is on [x]  Main level   [] Second level   [] Basement  [] N/A     ADL/IADL [x] Previously

## 2025-05-23 ENCOUNTER — APPOINTMENT (OUTPATIENT)
Age: 70
End: 2025-05-23
Payer: MEDICARE

## 2025-05-23 ENCOUNTER — HOSPITAL ENCOUNTER (OUTPATIENT)
Age: 70
Setting detail: THERAPIES SERIES
Discharge: HOME | End: 2025-05-23
Payer: MEDICARE

## 2025-05-23 NOTE — FLOWSHEET NOTE
[x] [facility]  Outpatient Rehabilitation &  Therapy  [address]  P: [phone]  F: [phone]              Therapy Cancel/No Show note    Date: 2025  Patient: Concepción Montes  : 1955  MRN: [id number]    Cancels/No Shows to date:     For today's appointment patient:    []  Cancelled    [x] Rescheduled appointment    [] No-show     Reason given by patient:    []  Patient ill    []  Conflicting appointment    [] No transportation      [] Conflict with work    [] No reason given    [] Weather related    [] COVID-19    [x] Other:   awaiting insurance approval   Comments:        [x] Next appointment was confirmed    Electronically signed by: Alpesh Washington PT

## 2025-05-27 ENCOUNTER — APPOINTMENT (OUTPATIENT)
Age: 70
End: 2025-05-27
Payer: MEDICARE

## 2025-05-27 ENCOUNTER — HOSPITAL ENCOUNTER (OUTPATIENT)
Age: 70
Setting detail: THERAPIES SERIES
Discharge: HOME | End: 2025-05-27
Payer: MEDICARE

## 2025-05-27 NOTE — FLOWSHEET NOTE
[x] [facility]  Outpatient Rehabilitation &  Therapy  [address]  P: [phone]  F: [phone]              Therapy Cancel/No Show note    Date: 2025  Patient: Concepción Montes  : 1955  MRN: [id number]    Cancels/No Shows to date: 0    For today's appointment patient:    []  Cancelled    [x] Rescheduled appointment    [] No-show     Reason given by patient:    []  Patient ill    []  Conflicting appointment    [] No transportation      [] Conflict with work    [] No reason given    [] Weather related    [] COVID-19    [x] Other:   awaiting insurance approval   Comments:        [x] Next appointment was confirmed    Electronically signed by: Alpesh Washington PT

## 2025-05-29 ENCOUNTER — HOSPITAL ENCOUNTER (OUTPATIENT)
Age: 70
Setting detail: THERAPIES SERIES
Discharge: HOME | End: 2025-05-29
Payer: MEDICARE

## 2025-05-29 ENCOUNTER — APPOINTMENT (OUTPATIENT)
Age: 70
End: 2025-05-29
Payer: MEDICARE

## 2025-05-29 PROCEDURE — 97110 THERAPEUTIC EXERCISES: CPT

## 2025-05-29 NOTE — FLOWSHEET NOTE
HEP/Ed  Method of Education: [x] Verbal  [x] Demo  [] Written  Comprehension of Education:  [x] Verbalizes understanding.  [] Demonstrates understanding.  [] Needs review.  [] Demonstrates/verbalizes HEP/Ed previously given.     Plan: [x] Continue current frequency toward long and short term goals.    [x] Specific Instructions for subsequent treatments: Advance l shldr ROM and LUE strength per surgeon's established protocol.Update home ex rx as indicated.       Time In:0930            Time Out: 1010    Electronically signed by:  Alpesh Washington, PT

## 2025-06-02 ENCOUNTER — HOSPITAL ENCOUNTER (OUTPATIENT)
Age: 70
Setting detail: THERAPIES SERIES
Discharge: HOME | End: 2025-06-02
Payer: MEDICARE

## 2025-06-02 PROCEDURE — 97110 THERAPEUTIC EXERCISES: CPT

## 2025-06-02 NOTE — FLOWSHEET NOTE
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  Pain:  [x] Yes  [] No Location: L upper trap   Pain Rating: (0-10 scale) 2/10  Pain altered Tx:  [] No  [] Yes  Action:  Comments: 0    Objective: Pt has no pain in scaption, but true flexion is uncomfortable for L shldr.  Modalities: Pt declines ice today.  Precautions:No active ER, IR, pushing with LUE or hand behind the back at this time. See protocol in pt's chart.  Exercises:  Exercise Reps/ Time Weight/ Level Comments   ROM pulleys 2 mins   scaption   UBE - fwd 2 mins   Minimal resistance   T-tube or elbow extension 20 reps Green    Bicep curls 20 reps  3#    Scapular retractions 20 reps     L shldr ER isometrics with t-tube 20 reps  Red          Supine bench press LUE 2 x 10  Light manual resistance    AROM in standing 2 x 10 reps                 PROM / stretches L shldr 9 mins     L scapular mobilization 1 min           R sidelying L shldr abd                                    Other:  Exercises  - Seated Scapular Retraction  - 2-3 x daily - 7 x weekly - 2 sets - 15 reps - 2 secs hold  - Standing Single Arm Elbow Flexion with Resistance  - 2 x daily - 5 x weekly - 2 sets - 15 reps - 2 secs

## 2025-06-03 ENCOUNTER — APPOINTMENT (OUTPATIENT)
Age: 70
End: 2025-06-03
Payer: MEDICARE

## 2025-06-05 ENCOUNTER — HOSPITAL ENCOUNTER (OUTPATIENT)
Age: 70
Setting detail: THERAPIES SERIES
Discharge: HOME | End: 2025-06-05
Payer: MEDICARE

## 2025-06-05 PROCEDURE — 97110 THERAPEUTIC EXERCISES: CPT

## 2025-06-05 NOTE — FLOWSHEET NOTE
pain    Pt. Education:  [x] Yes  [] No  [x] Reviewed Prior HEP/Ed  Method of Education: [x] Verbal  [x] Demo  [] Written  Comprehension of Education:  [x] Verbalizes understanding.  [] Demonstrates understanding.  [] Needs review.  [] Demonstrates/verbalizes HEP/Ed previously given.     Plan: [x] Continue current frequency toward long and short term goals.    [x] Specific Instructions for subsequent treatments: Advance l shldr ROM and LUE strength per surgeon's established protocol.Update home ex rx as indicated.       Time In: 0930         Time Out: 1010    Electronically signed by:  Alpesh Washington, PT

## 2025-06-10 ENCOUNTER — HOSPITAL ENCOUNTER (OUTPATIENT)
Age: 70
Setting detail: THERAPIES SERIES
Discharge: HOME | End: 2025-06-10
Payer: MEDICARE

## 2025-06-10 PROCEDURE — 97110 THERAPEUTIC EXERCISES: CPT

## 2025-06-10 NOTE — FLOWSHEET NOTE
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  Precautions:No active ER, IR, pushing with LUE or hand behind the back at this time. See protocol in pt's chart.  Exercises:  Exercise Reps/ Time Weight/ Level Comments NP=Not  Performed   ROM pulleys 2 mins   scaption    UBE - fwd 2 mins   Minimal resistance NP   T-tube for shldr extension even with trochanter 2 x 15 reps Blue     Bicep curls 20 reps  3#     Scapular retractions 20 reps      L shldr ER isometrics with t-tube 20 reps  Red            Supine bench press LUE 2 x 10  Light manual resistance     AROM in standing 2 x 10 reps      Cable tower shldr/elbow extension 15 reps             PROM / stretches L shldr 9 mins L1.5     L scapular mobilization 1 min             R sidelying L shldr abd 2 x 10 reps active     Supine horizontal abd/adduction                                   Other:  Exercises  - Seated Scapular Retraction  - 2-3 x daily - 7 x weekly - 2 sets - 15 reps

## 2025-06-12 ENCOUNTER — HOSPITAL ENCOUNTER (OUTPATIENT)
Age: 70
Setting detail: THERAPIES SERIES
Discharge: HOME | End: 2025-06-12
Payer: MEDICARE

## 2025-06-12 PROCEDURE — 97110 THERAPEUTIC EXERCISES: CPT

## 2025-06-12 NOTE — FLOWSHEET NOTE
strength.    LTG (to be met in 20 treatments)  Pt will demonstrate L shldr AROM equal to R shldr to facilitate function in ADL.  Pt will demonstrate gross L shldr strength of 4+/5 to facilitate function in ADL  Pt will report 0/10 L shldr pain    Pt. Education:  [x] Yes  [] No  [x] Reviewed Prior HEP/Ed  Method of Education: [x] Verbal  [x] Demo  [] Written  Comprehension of Education:  [x] Verbalizes understanding.  [] Demonstrates understanding.  [] Needs review.  [] Demonstrates/verbalizes HEP/Ed previously given.     Plan: [x] Continue current frequency toward long and short term goals.    [x] Specific Instructions for subsequent treatments: Advance l shldr ROM and LUE strength per surgeon's established protocol.Update home ex rx as indicated.       Time In: 0930         Time Out: 1005    Electronically signed by:  Alpesh Washington, PT

## 2025-06-17 ENCOUNTER — HOSPITAL ENCOUNTER (OUTPATIENT)
Age: 70
Setting detail: THERAPIES SERIES
Discharge: HOME | End: 2025-06-17
Payer: MEDICARE

## 2025-06-17 PROCEDURE — 97110 THERAPEUTIC EXERCISES: CPT

## 2025-06-17 NOTE — FLOWSHEET NOTE
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 Mild stiffness  Pain:  [x] Yes  [] No Location: L upper trap   Pain Rating: (0-10 scale) 2/10 to 4/10  Pain altered Tx:  [] No  [] Yes  Action:  Comments: 0    Objective: L shldr AROM in flexion is 145 degrees  Modalities:Ice pack to go  Precautions:No active ER, IR, pushing with LUE or hand behind the back at this time. See protocol in pt's chart.  Exercises:  Exercise Reps/ Time Weight/ Level Comments NP=Not  Performed   ROM pulleys 2 mins   scaption    UBE - fwd 2 mins   Minimal resistance    T-tube for shldr extension even with trochanter 2 x 15 reps Blue     Bicep curls 20 reps  3#     Scapular retractions 20 reps      L shldr ER isometrics with t-tube 20 reps  Red            Supine bench press LUE 2 x 10  Light manual resistance     AROM in standing 2 x 7 reps      Cable tower shldr/elbow extension 15 reps L1            PROM / stretches L shldr 9 mins      L scapular mobilization 1 min   NP          R sidelying L shldr abd 2 x 10 reps active     Supine horizontal abd/adduction                                   Other:  Exercises  - Seated Scapular Retraction  - 2-3 x daily - 7 x weekly - 2 sets - 15 reps - 2 secs

## 2025-06-19 ENCOUNTER — HOSPITAL ENCOUNTER (OUTPATIENT)
Age: 70
Setting detail: THERAPIES SERIES
Discharge: HOME | End: 2025-06-19
Payer: MEDICARE

## 2025-06-19 PROCEDURE — 97110 THERAPEUTIC EXERCISES: CPT

## 2025-06-19 NOTE — FLOWSHEET NOTE
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 Mild stiffness  Pain:  [x] Yes  [] No Location: L upper trap   Pain Rating: (0-10 scale) 2/10 to 4/10  Pain altered Tx:  [] No  [] Yes  Action:  Comments: 0    Objective: L shldr AROM in flexion is 145 degrees  Modalities:Ice pack to go  Precautions:No active ER, IR, pushing with LUE or hand behind the back at this time. See protocol in pt's chart.  Exercises:  Exercise Reps/ Time Weight/ Level Comments NP=Not  Performed   ROM pulleys 2 mins   scaption    UBE - fwd 2 mins   Minimal resistance    T-tube for shldr extension even with trochanter 2 x 15 reps Blue     Bicep curls 20 reps  3#     Scapular retractions 20 reps      L shldr ER isometrics with t-tube 20 reps  Red            Supine bench press LUE 2 x 10  Light manual resistance     AROM in standing 2 x 7 reps      Cable tower shldr/elbow extension 15 reps L1            PROM / stretches L shldr 9 mins      L scapular mobilization 1 min   NP          R sidelying L shldr abd 2 x 10 reps active     Supine horizontal abd/adduction                                   Other:  Exercises  - Seated Scapular Retraction  - 2-3 x daily - 7 x weekly - 2 sets - 15 reps - 2 secs

## 2025-06-19 NOTE — FLOWSHEET NOTE
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  Pain:  [x] Yes  [] No Location: L upper trap   Pain Rating: (0-10 scale) 2/10 to 4/10  Pain altered Tx:  [] No  [] Yes  Action:  Comments: 0    Objective: L shldr AROM in flexion is 145 degrees  Modalities:Ice pack to go  Precautions:No active ER, IR, pushing with LUE or hand behind the back at this time. See protocol in pt's chart.  Exercises:  Exercise Reps/ Time Weight/ Level Comments NP=Not  Performed          UBE - fwd 2 mins   Minimal resistance    T-tube for shldr extension  2 x 15 reps Blue     Bicep curls 20 reps  3#     Scapular retractions 20 reps      L shldr ER with t-tube 20 reps  Red            Supine bench press LUE 2 x 10  Light manual resistance     AROM in standing 2 x 7 reps  Scaption. abduction    Cable tower shldr/elbow extension 15 reps L1            PROM / stretches L shldr 9 mins      L scapular mobilization 1 min   NP          R sidelying L shldr abd 2 x 10 reps active     Supine horizontal abd/adduction 10 reps  Active  Pain free range                                Other:  Exercises  - Seated Scapular Retraction  - 2-3 x daily - 7 x weekly - 2 sets - 15 reps - 2

## 2025-06-23 ENCOUNTER — HOSPITAL ENCOUNTER (OUTPATIENT)
Age: 70
Setting detail: THERAPIES SERIES
Discharge: HOME | End: 2025-06-23
Payer: MEDICARE

## 2025-06-23 PROCEDURE — 97110 THERAPEUTIC EXERCISES: CPT

## 2025-06-23 NOTE — FLOWSHEET NOTE
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  Pain:  [x] Yes  [] No Location: Lateral L GH joint Pain Rating: (0-10 scale) 1/10 to 3/10  Pain altered Tx:  [x] No  [] Yes  Action:  Comments: 0    Objective: L shldr AROM in flexion is 155 degrees  Modalities:0  Precautions:No active ER, IR, pushing with LUE or hand behind the back at this time. See protocol in pt's chart.  Exercises:  Exercise Reps/ Time Weight/ Level Comments NP=Not  Performed          UBE - fwd 2 mins   Minimal resistance    T-tube for shldr extension  2 x 15 reps Blue     Bicep curls 20 reps  3#     Scapular retractions 20 reps      L shldr ER with t-tube 20 reps  Red            Supine bench press LUE 2 x 10  Light manual resistance     AROM in standing 2 x 7 reps  Scaption. abduction    Cable tower shldr/elbow extension 15 reps L2     L shldr IR/Add/Ext stretch with wand.       PROM / stretches L shldr 8 mins                    R sidelying L shldr abd 2 x 10 reps active     Supine horizontal abd/adduction 10 reps  Active  Pain free range                                Other:  Exercises  - Seated Scapular Retraction  - 2-3 x daily - 7 x weekly - 2 sets - 15 reps - 2 secs hold  - Standing Single

## 2025-06-26 ENCOUNTER — HOSPITAL ENCOUNTER (OUTPATIENT)
Age: 70
Setting detail: THERAPIES SERIES
Discharge: HOME | End: 2025-06-26
Payer: MEDICARE

## 2025-06-26 PROCEDURE — 97110 THERAPEUTIC EXERCISES: CPT

## 2025-06-26 NOTE — PROGRESS NOTES
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 Strength in ADL remains substandard, but is increasing slowly.    Pain:  [x] Yes  [] No Location: Lateral L GH joint Pain Rating: (0-10 scale) 1/10 to 3/10  Pain altered Tx:  [x] No  [] Yes  Action:  Comments: 0    Objective: Shldr AROM in sitting = flexion is L 150 degrees  R 165, Scaption L 155  R 165, IR L 75  R 80, ER L 55  R 75  Strength flexion L 4/5  R 4+/5, abd L 4-/5  R 4/5, ER, IR L 4/5  R 4+/5, Biceps L 4/5  R 4+/5  Triceps L 4/5  R 4+/5  Modalities:0  Precautions:No active ER, IR, pushing with LUE or hand behind the back at this time. See protocol in pt's chart.  Exercises:  Exercise Reps/ Time Weight/ Level Comments NP=Not  Performed          UBE - fwd 2 mins   Minimal resistance    T-tube for shldr extension  2 x 15 reps Blue     Bicep curls 20 reps  3#     Scapular retractions 20 reps      L shldr ER with t-tube 20 reps  Red            Supine bench press LUE 2 x 10  Light manual resistance     AROM in standing 2 x 7 reps  Scaption. abduction    Cable tower shldr/elbow extension 15 reps L2     L shldr IR/Add/Ext stretch with wand.       PROM /

## 2025-07-01 ENCOUNTER — HOSPITAL ENCOUNTER (OUTPATIENT)
Age: 70
Setting detail: THERAPIES SERIES
Discharge: HOME | End: 2025-07-01
Payer: MEDICARE

## 2025-07-01 PROCEDURE — 97110 THERAPEUTIC EXERCISES: CPT

## 2025-07-01 NOTE — PROGRESS NOTES
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 Pt may have overdone the activity level. On 2025 she was a bit sore and today pain is 2/10.    Pain:  [x] Yes  [] No Location: Lateral L GH joint Pain Rating: (0-10 scale) 1/10 to 3/10  Pain altered Tx:  [x] No  [] Yes  Action:  Comments: 0    Objective: Shldr AROM in sitting = flexion is L 150 degrees  R 165, Scaption L 155  R 165, IR L 75  R 80, ER L 55  R 75  Strength flexion L 4/5  R 4+/5, abd L 4-/5  R 4/5, ER, IR L 4/5  R 4+/5, Biceps L 4/5  R 4+/5  Triceps L 4/5  R 4+/5  Modalities:0  Precautions: Minimal force in combination motion of L shldr IR/Ext/Adduction.  Exercises:  Exercise Reps/ Time Weight/ Level Comments NP=Not  Performed          UBE - fwd 2 mins   Minimal resistance    T-tube for shldr extension  2 x 15 reps Blue     Bicep curls 20 reps  3#            L shldr ER with t-tube 20 reps  Red            Supine bench press LUE 2 x 10  Moderate manual resistance     AROM in standing 2 x 10 reps  Scaption. abduction    Cable tower shldr/elbow extension 15 reps L2     L shldr IR/Add/Ext stretch with wand. 10 x 5 secs each      PROM / stretches L shldr 8 mins

## 2025-07-03 ENCOUNTER — HOSPITAL ENCOUNTER (OUTPATIENT)
Age: 70
Setting detail: THERAPIES SERIES
Discharge: HOME | End: 2025-07-03
Payer: MEDICARE

## 2025-07-03 PROCEDURE — 97110 THERAPEUTIC EXERCISES: CPT

## 2025-07-03 NOTE — PROGRESS NOTES
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 Pain is minimal and strength is increasing steadily.    Pain:  [x] Yes  [] No Location: Lateral L GH joint Pain Rating: (0-10 scale) 1/10 to 3/10  Pain altered Tx:  [x] No  [] Yes  Action:  Comments: Mild tightness of L shldr joint capsule noted at end range of ER and flexion.    Objective: (7/3/2025) Shldr AROM in standing = flexion is L 155 degrees  R 165, Scaption L 155  R 165, IR L 7  R 80, ER L 55  R 75  Strength flexion L 4/5  R 4+/5, abd L 4-/5  R 4/5, ER, IR L 4/5  R 4+/5, Biceps L 4/5  R 4+/5  Triceps L 4/5  R 4+/5  Modalities:0  Precautions: Minimal force in combination motion of L shldr IR/Ext/Adduction.  Exercises:  Exercise Reps/ Time Weight/ Level Comments NP=Not  Performed          UBE - fwd 2 mins   Minimal resistance    T-tube for shldr extension  2 x 15 reps Blue     Bicep curls 20 reps  3#            L shldr ER with t-tube 20 reps  Red            Supine bench press LUE 2 x 10  Moderate manual resistance     AROM in standing 2 x 10 reps  Scaption. abduction    Cable tower shldr/elbow extension 15 reps L3     L shldr IR/Add/Ext stretch with wand. 10 x 5 secs each

## 2025-07-08 ENCOUNTER — HOSPITAL ENCOUNTER (OUTPATIENT)
Age: 70
Setting detail: THERAPIES SERIES
Discharge: HOME | End: 2025-07-08
Payer: MEDICARE

## 2025-07-08 PROCEDURE — 97110 THERAPEUTIC EXERCISES: CPT

## 2025-07-08 NOTE — PROGRESS NOTES
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 Pain is minimal and strength is increasing steadily.    Pain:  [x] Yes  [] No Location: Lateral L GH joint Pain Rating: (0-10 scale) 1/10 to 2/10  Pain altered Tx:  [x] No  [] Yes  Action:  Comments: Mild tightness of L shldr joint capsule noted at end range of ER and flexion.    Objective: (7/3/2025) Shldr AROM in standing = flexion is L 155 degrees  R 165, Scaption L 155  R 165, IR L 7  R 80, ER L 55  R 75  Strength flexion L 4/5  R 4+/5, abd L 4-/5  R 4/5, ER, IR L 4/5  R 4+/5, Biceps L 4/5  R 4+/5  Triceps L 4/5  R 4+/5  Modalities:0  Precautions: Minimal force in combination motion of L shldr IR/Ext/Adduction.  Exercises:  Exercise Reps/ Time Weight/ Level Comments NP=Not  Performed          UBE - fwd 2 mins   Minimal resistance    T-tube for shldr extension  2 x 15 reps Blue     Bicep curls 20 reps  3#            L shldr ER with t-tube 20 reps  Red            Supine bench press LUE 2 x 10  Moderate manual resistance     AROM in standing 2 x 10 reps  Scaption. abduction    Cable tower shldr/elbow extension 15 reps L3     L shldr IR/Add/Ext stretch with wand. 10 x 5 secs each

## 2025-07-10 ENCOUNTER — HOSPITAL ENCOUNTER (OUTPATIENT)
Age: 70
Setting detail: THERAPIES SERIES
Discharge: HOME | End: 2025-07-10
Payer: MEDICARE

## 2025-07-10 PROCEDURE — 97110 THERAPEUTIC EXERCISES: CPT

## 2025-07-10 NOTE — PROGRESS NOTES
shldr stiffness in AAROM/AROM - Goal met  Pt will demonstrate gross 4-/5 L shldr strength in active motion allowed by rehab protocol. - Goal met  Pt will demonstrate L shldr AAROM equal to or greater than 75% of R shldr. Goal met  Pt will voice working knowledge of fall prevention and L shldr AROM restrictions per protocol. - Goal met  Pt will be independent in  her home ex rx for L shldr ROM and strength. - Goal met    LTG (to be met in 20 treatments)  Pt will demonstrate L shldr AROM equal to R shldr to facilitate function in ADL.  Pt will demonstrate gross L shldr strength of 4+/5 to facilitate function in ADL  Pt will report 0/10 L shldr pain    Pt. Education:  [x] Yes - schedule [] No  [] Reviewed Prior HEP/Ed  Method of Education: [x] Verbal  [] Demo  [] Written  Comprehension of Education:  [x] Verbalizes understanding.  [] Demonstrates understanding.  [] Needs review.  [] Demonstrates/verbalizes HEP/Ed previously given.     Plan: [x] Continue current frequency toward long and short term goals 1x/week x 2 weeks.  [x] Specific Instructions for subsequent treatments: Advance l shldr ROM and LUE strength per surgeon's established protocol.Update home ex rx as indicated.       Time In: 0845     Time Out: 0820    Electronically signed by:  Alpesh Washington, PT

## 2025-07-14 ENCOUNTER — HOSPITAL ENCOUNTER (OUTPATIENT)
Age: 70
Setting detail: THERAPIES SERIES
Discharge: HOME | End: 2025-07-14
Payer: MEDICARE

## 2025-07-14 PROCEDURE — 97110 THERAPEUTIC EXERCISES: CPT

## 2025-07-14 NOTE — FLOWSHEET NOTE
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 Pt indicates the area of pain consistent with biceps tendon.    Pain:  [x] Yes  [] No Location: Lateral L GH joint Pain Rating: (0-10 scale) 5/10   Pain altered Tx:  [x] No  [] Yes  Action:  Comments: Pt states next appointment on 2025 may be her final session.    Objective: (7/3/2025) Shldr AROM in standing = flexion is L 155 degrees  R 165, Scaption L 155  R 165, IR L 7  R 80, ER L 55  R 75  Strength flexion L 4/5  R 4+/5, abd L 4-/5  R 4/5, ER, IR L 4/5  R 4+/5, Biceps L 4/5  R 4+/5  Triceps L 4/5  R 4+/5  Modalities:Ice pack to anterio rL GH joint x 5 mins to end session.  Precautions: Minimal force in combination motion of L shldr IR/Ext/Adduction.  Exercises:  Exercise Reps/ Time Weight/ Level Comments NP=Not  Performed          UBE - fwd 2 mins   Minimal resistance    T-tube for shldr extension  2 x 15 reps Blue     Bicep curls 20 reps  3#            L shldr ER with t-tube 20 reps  Red            Supine bench press LUE 2 x 10  Moderate manual resistance     AROM in standing 2 x 10 reps

## 2025-07-17 ENCOUNTER — APPOINTMENT (OUTPATIENT)
Age: 70
End: 2025-07-17
Payer: MEDICARE

## 2025-07-21 ENCOUNTER — HOSPITAL ENCOUNTER (OUTPATIENT)
Age: 70
Setting detail: THERAPIES SERIES
Discharge: HOME | End: 2025-07-21
Payer: MEDICARE

## 2025-07-21 PROCEDURE — 97110 THERAPEUTIC EXERCISES: CPT

## 2025-07-21 NOTE — DISCHARGE SUMMARY
[x] 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 Pt will make today her last day of PT and she will continue with home ex rx.    Pain:  [x] Yes  [] No Location: Lateral L GH joint Pain Rating: (0-10 scale) 5/10   Pain altered Tx:  [x] No  [] Yes  Action:  Comments: Pt states next appointment on 2025 may be her final session.    Objective: (2025) Shldr AROM in standing = flexion is L 160 degrees  R 165, Scaption L 155  R 165, IR L 7  R 80, ER L 55  R 75  Strength flexion L 4/5  R 4+/5, abd L 4/5  R 4/5, ER, IR L +/5  R 4+/5, Biceps L 4/5  R 4+/5  Triceps L 4/5  R 4+/5  Modalities:Ice pack to anterior L GH joint x 10 mins to end session.  Precautions:0  Exercises:  Exercise Reps/ Time Weight/ Level Comments NP=Not  Performed          UBE - fwd 2 mins   Minimal resistance    T-tube for shldr extension  2 x 15 reps Blue     Bicep curls 20 reps  5#            L shldr ER with t-tube 20 reps  Red            Supine bench press LUE 2 x 10  Moderate manual resistance     AROM in standing 2 x 10 reps  Scaption. abduction    Cable tower shldr/elbow extension 15 reps L3     L shldr IR/Add/Ext